# Patient Record
Sex: MALE | Race: WHITE | Employment: FULL TIME | URBAN - NONMETROPOLITAN AREA
[De-identification: names, ages, dates, MRNs, and addresses within clinical notes are randomized per-mention and may not be internally consistent; named-entity substitution may affect disease eponyms.]

---

## 2020-09-17 ENCOUNTER — OFFICE VISIT (OUTPATIENT)
Dept: FAMILY MEDICINE CLINIC | Age: 60
End: 2020-09-17
Payer: COMMERCIAL

## 2020-09-17 VITALS — TEMPERATURE: 98.8 F | HEART RATE: 88 BPM | OXYGEN SATURATION: 95 %

## 2020-09-17 DIAGNOSIS — J01.00 ACUTE MAXILLARY SINUSITIS, RECURRENCE NOT SPECIFIED: Primary | ICD-10-CM

## 2020-09-17 DIAGNOSIS — H66.90 ACUTE OTITIS MEDIA, UNSPECIFIED OTITIS MEDIA TYPE: ICD-10-CM

## 2020-09-17 PROCEDURE — 99213 OFFICE O/P EST LOW 20 MIN: CPT | Performed by: NURSE PRACTITIONER

## 2020-09-17 RX ORDER — DOXYCYCLINE 100 MG/1
100 TABLET ORAL 2 TIMES DAILY
Qty: 20 TAB | Refills: 0 | Status: SHIPPED | OUTPATIENT
Start: 2020-09-17 | End: 2020-09-27

## 2020-09-17 NOTE — PROGRESS NOTES
BOLA Justin is a 62 y.o. male and presents today for Sinus Pain (headache, runny nose,sneezing)  Reports sinus pain/pressure, ear pain and congestion x 1week; he has no concern for covid and does not want to be tested. Recent Travel Screening and Travel History documentation     Travel Screening     Question   Response    In the last month, have you been in contact with someone who was confirmed or suspected to have Coronavirus / COVID-19? No / Unsure    Do you have any of the following symptoms? Have you traveled internationally in the last month? No      Travel History   Travel since 08/17/20     No documented travel since 08/17/20          Allergies    Allergies   Allergen Reactions    Pcn [Penicillins] Other (comments)        Medications    Current Outpatient Medications   Medication Sig Dispense    doxycycline (ADOXA) 100 mg tablet Take 1 Tab by mouth two (2) times a day for 10 days. 20 Tab    Victoza 2-Gilberto 0.6 mg/0.1 mL (18 mg/3 mL) pnij INJECT 1.8 MG SUBCUTANEOUSLY ONCE DAILY 9 mL    atorvastatin (LIPITOR) 10 mg tablet TAKE 1 TABLET BY MOUTH ONCE DAILY     ONETOUCH ULTRA BLUE TEST STRIP strip USE 1 STRIP TO CHECK GLUCOSE ONCE DAILY PATIENT NEED APPOINTMENT     Insulin Needles, Disposable, 32 gauge x 5/32\" ndle USE SUBCUTANEOUSLY ONCE DAILY FOR VICTOZA INJECTIONS     JANUVIA 50 mg tablet      multivitamin (ONE A DAY) tablet Take 1 Tab by mouth daily.  metFORMIN (GLUCOPHAGE) 1,000 mg tablet      amLODIPine (NORVASC) 5 mg tablet Take 5 mg by mouth daily.  lisinopril-hydrochlorothiazide (PRINZIDE, ZESTORETIC) 20-25 mg per tablet Take  by mouth daily.  aspirin delayed-release 81 mg tablet Take  by mouth daily. No current facility-administered medications for this visit. Problem List    Patient Active Problem List    Diagnosis Date Noted    Obesity (BMI 30-39. 5)     Male erectile dysfunction     Morbid obesity with BMI of 45.0-49.9, adult ([facility] 04/19/2016    Erectile dysfunction 04/19/2016    Prostate cancer ([facility] 04/04/2016    Diabetes (HCC)     Low testosterone         Vitals    Visit Vitals  Pulse 88   Temp 98.8 °F (37.1 °C)   SpO2 95%        ROS    Review of Systems   Constitutional: Negative for chills and malaise/fatigue. HENT: Positive for congestion, ear pain and sinus pain. Negative for sore throat. Eyes: Negative for blurred vision. Respiratory: Negative for cough, sputum production, shortness of breath and wheezing. Cardiovascular: Negative for chest pain. Gastrointestinal: Negative for diarrhea, nausea and vomiting. Musculoskeletal: Negative for myalgias. Skin: Negative for rash. Neurological: Negative for dizziness, weakness and headaches. Endo/Heme/Allergies: Negative for environmental allergies. Physical Exam    Physical Exam  Vitals signs and nursing note reviewed. Constitutional:       General: He is awake. He is not in acute distress. Appearance: Normal appearance. He is not ill-appearing. HENT:      Head: Normocephalic and atraumatic. Right Ear: Ear canal and external ear normal. Tympanic membrane is erythematous and bulging. Left Ear: Ear canal and external ear normal. Tympanic membrane is erythematous. Nose: Congestion and rhinorrhea present. Right Turbinates: Enlarged and swollen. Left Turbinates: Enlarged and swollen. Right Sinus: Maxillary sinus tenderness present. No frontal sinus tenderness. Left Sinus: Maxillary sinus tenderness present. No frontal sinus tenderness. Mouth/Throat:      Mouth: Mucous membranes are moist.      Pharynx: Oropharynx is clear. Eyes:      Extraocular Movements: Extraocular movements intact. Neck:      Musculoskeletal: Normal range of motion and neck supple. Cardiovascular:      Rate and Rhythm: Normal rate and regular rhythm. Pulmonary:      Breath sounds: Normal breath sounds. No stridor. No wheezing, rhonchi or rales. Musculoskeletal: Normal range of motion. Skin:     Findings: No rash. Neurological:      Mental Status: He is alert and oriented to person, place, and time. Psychiatric:         Behavior: Behavior normal.          Assessment/Plan  1. Acute maxillary sinusitis, recurrence not specified  Given the duration of his illness, will go ahead and tx with abx per Rx; I recommend zyrtec and flonase if not already using; I recommend sinus rinses at least 2x/day; may use mucinex for thinning of mucous; increase fluids; tylenol prn as directed on packaging; f/u 1-2wks if not better, sooner for worsening symptoms.   - doxycycline (ADOXA) 100 mg tablet; Take 1 Tab by mouth two (2) times a day for 10 days. Dispense: 20 Tab; Refill: 0    2. Acute otitis media, unspecified otitis media type    - doxycycline (ADOXA) 100 mg tablet; Take 1 Tab by mouth two (2) times a day for 10 days. Dispense: 20 Tab; Refill: 0       GRACE Guzman, FNP-BC       This visit was provided as a focused evaluation during the COVID -19 pandemic/national emergency. A comprehensive review of all previous patient history and testing was not conducted. Pertinent findings were elicited during the visit.

## 2020-09-17 NOTE — PROGRESS NOTES
Vadim Notice presents today for   Chief Complaint   Patient presents with    Sinus Pain     headache, runny nose,sneezing       Is someone accompanying this pt? no, no (name) no (relationship) (temp no)    Is the patient using any DME equipment during OV? no    Depression Screening:  3 most recent PHQ Screens 9/17/2020   Little interest or pleasure in doing things Not at all   Feeling down, depressed, irritable, or hopeless Not at all   Total Score PHQ 2 0       Learning Assessment:  No flowsheet data found. Health Maintenance reviewed and discussed and ordered per Provider. Health Maintenance Due   Topic Date Due    Hepatitis C Screening  1960    Pneumococcal 0-64 years (1 of 1 - PPSV23) 08/11/1967    Foot Exam Q1  08/11/1971    A1C test (Diabetic or Prediabetic)  08/11/1971    MICROALBUMIN Q1  08/11/1971    Eye Exam Retinal or Dilated  08/11/1971    Lipid Screen  08/11/1971    DTaP/Tdap/Td series (1 - Tdap) 08/11/1982    Shingrix Vaccine Age 50> (1 of 2) 08/11/2011    FOBT Q1Y Age 50-75  08/11/2011    Flu Vaccine (1) 09/01/2020   . Coordination of Care:  1. Have you been to the ER, urgent care clinic since your last visit? Hospitalized since your last visit? no    2. Have you seen or consulted any other health care providers outside of the [address] [place] [address] since your last visit? Include any pap smears or colon screening.  no

## 2020-11-16 RX ORDER — METFORMIN HYDROCHLORIDE 1000 MG/1
TABLET ORAL
Qty: 180 TAB | Refills: 0 | Status: SHIPPED | OUTPATIENT
Start: 2020-11-16 | End: 2021-02-15

## 2020-11-20 DIAGNOSIS — Z12.5 SCREENING FOR MALIGNANT NEOPLASM OF PROSTATE: ICD-10-CM

## 2020-11-20 DIAGNOSIS — Z13.29 THYROID DISORDER SCREEN: ICD-10-CM

## 2020-11-20 DIAGNOSIS — E55.9 VITAMIN D DEFICIENCY: ICD-10-CM

## 2020-11-20 DIAGNOSIS — E11.9 TYPE 2 DIABETES MELLITUS WITHOUT COMPLICATION, UNSPECIFIED WHETHER LONG TERM INSULIN USE (HCC): ICD-10-CM

## 2020-11-20 DIAGNOSIS — E53.8 COBALAMIN DEFICIENCY: ICD-10-CM

## 2020-11-20 DIAGNOSIS — E11.9 TYPE 2 DIABETES MELLITUS WITHOUT COMPLICATION, UNSPECIFIED WHETHER LONG TERM INSULIN USE (HCC): Primary | ICD-10-CM

## 2020-11-20 DIAGNOSIS — Z00.00 ENCOUNTER FOR ROUTINE ADULT HEALTH EXAMINATION WITHOUT ABNORMAL FINDINGS: ICD-10-CM

## 2020-12-04 RX ORDER — ATORVASTATIN CALCIUM 10 MG/1
TABLET, FILM COATED ORAL
Qty: 90 TAB | Refills: 0 | Status: SHIPPED | OUTPATIENT
Start: 2020-12-04 | End: 2021-03-08

## 2020-12-15 LAB — HBA1C MFR BLD HPLC: 9 %

## 2021-02-15 RX ORDER — METFORMIN HYDROCHLORIDE 1000 MG/1
TABLET ORAL
Qty: 180 TAB | Refills: 0 | Status: SHIPPED | OUTPATIENT
Start: 2021-02-15 | End: 2021-05-17

## 2021-02-16 ENCOUNTER — CLINICAL SUPPORT (OUTPATIENT)
Dept: FAMILY MEDICINE CLINIC | Age: 61
End: 2021-02-16
Payer: COMMERCIAL

## 2021-02-16 DIAGNOSIS — E11.9 TYPE 2 DIABETES MELLITUS WITHOUT COMPLICATION, UNSPECIFIED WHETHER LONG TERM INSULIN USE (HCC): Primary | ICD-10-CM

## 2021-02-16 LAB — HBA1C MFR BLD HPLC: 9.7 %

## 2021-02-16 PROCEDURE — 83036 HEMOGLOBIN GLYCOSYLATED A1C: CPT | Performed by: NURSE PRACTITIONER

## 2021-02-16 NOTE — PROGRESS NOTES
Cleaned patient's left middle finger with alcohol wipe. Fingerstick in patient's left middle finger for A1C check. Patient tolerated well, bleeding controlled.

## 2021-02-18 ENCOUNTER — OFFICE VISIT (OUTPATIENT)
Dept: FAMILY MEDICINE CLINIC | Age: 61
End: 2021-02-18

## 2021-02-18 VITALS — WEIGHT: 287 LBS | BODY MASS INDEX: 38.87 KG/M2 | HEIGHT: 72 IN | RESPIRATION RATE: 19 BRPM

## 2021-02-18 DIAGNOSIS — E66.01 MORBID OBESITY (HCC): Primary | ICD-10-CM

## 2021-02-18 DIAGNOSIS — E11.9 TYPE 2 DIABETES MELLITUS WITHOUT COMPLICATION, WITHOUT LONG-TERM CURRENT USE OF INSULIN (HCC): ICD-10-CM

## 2021-02-18 DIAGNOSIS — Z02.4 DRIVER'S PERMIT PHYSICAL EXAMINATION: ICD-10-CM

## 2021-02-18 PROCEDURE — 99213 OFFICE O/P EST LOW 20 MIN: CPT | Performed by: NURSE PRACTITIONER

## 2021-02-18 RX ORDER — CHOLECALCIFEROL (VITAMIN D3) 50 MCG
1 CAPSULE ORAL DAILY
COMMUNITY

## 2021-02-18 RX ORDER — PHENTERMINE HYDROCHLORIDE 37.5 MG/1
TABLET ORAL
Qty: 30 TAB | Refills: 2 | Status: SHIPPED | OUTPATIENT
Start: 2021-02-18 | End: 2021-05-17

## 2021-02-18 NOTE — PROGRESS NOTES
Solomon Epperson presents today for   Chief Complaint   Patient presents with   • Employment Physical       Is someone accompanying this pt? No    Is the patient using any DME equipment during OV? No    Depression Screening:  3 most recent PHQ Screens 2/18/2021   Little interest or pleasure in doing things Not at all   Feeling down, depressed, irritable, or hopeless Not at all   Total Score PHQ 2 0       Learning Assessment:  Learning Assessment 2/18/2021   PRIMARY LEARNER Patient   HIGHEST LEVEL OF EDUCATION - PRIMARY LEARNER  GRADUATED HIGH SCHOOL OR GED   BARRIERS PRIMARY LEARNER NONE   CO-LEARNER CAREGIVER No   PRIMARY LANGUAGE ENGLISH   LEARNER PREFERENCE PRIMARY LISTENING   ANSWERED BY patient   RELATIONSHIP SELF         Health Maintenance reviewed and discussed and ordered per Provider.    Health Maintenance Due   Topic Date Due   • Pneumococcal 0-64 years (1 of 1 - PPSV23) 08/11/1967   • Foot Exam Q1  08/11/1971   • MICROALBUMIN Q1  08/11/1971   • Eye Exam Retinal or Dilated  08/11/1971   • COVID-19 Vaccine (1 of 2) 08/11/1977   • DTaP/Tdap/Td series (1 - Tdap) 08/11/1982   • Shingrix Vaccine Age 50> (1 of 2) 08/11/2011   • Colorectal Cancer Screening Combo  08/11/2011   • Flu Vaccine (1) 09/01/2020   .      Coordination of Care:  1. Have you been to the ER, urgent care clinic since your last visit? Hospitalized since your last visit? No    2. Have you seen or consulted any other health care providers outside of the [facility] since your last visit? Include any pap smears or colon screening. No

## 2021-02-18 NOTE — PROGRESS NOTES
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 9)     [facility]     [id number] [place] 7 (3+4) adenocarcinoma of the prostate in 7/12 cores involving 20-70% of each specimen and [place] 6 disease in 5/12 cores involving 3-50% of each specimen     Past Surgical History:   Procedure Laterality Date    HX APPENDECTOMY      HX PROSTATECTOMY  2016    [facility] Dr Huston     Social History     Socioeconomic History    Marital status:      Spouse name: Not on file    Number of children: Not on file    Years of education: Not on file    Highest education level: Not on file   Tobacco Use    Smoking status: Former Smoker     Years: 10.00     Quit date: 2010     Years since quittin.0    Smokeless tobacco: Never Used   Substance and Sexual Activity    Alcohol use: Yes     Alcohol/week: 0.0 standard drinks     Frequency: 2-4 times a month     Drinks per session: 1 or 2     Binge frequency: Never    Drug use: Never     Current Outpatient Medications   Medication Sig Dispense Refill    omega 3-dha-epa-fish oil (Fish Oil) 100-160-1,000 mg cap Take 1 Cap by mouth daily.  phentermine (ADIPEX-P) 37.5 mg tablet 1 tab po once daily 30 Tab 2    dapagliflozin (Farxiga) 10 mg tab tablet Take 1 Tab by mouth daily.  30 Tab 2    metFORMIN (GLUCOPHAGE) 1,000 mg tablet Take 1 tablet by mouth twice daily 180 Tab 0    atorvastatin (LIPITOR) 10 mg tablet Take 1 tablet by mouth once daily 90 Tab 0    Victoza 3-Gilberto 0.6 mg/0.1 mL (18 mg/3 mL) pnij INJECT 1.8 MG SUBCUTANEOUSLY ONCE DAILY 9 mL 3    ONETOUCH ULTRA BLUE TEST STRIP strip USE 1 STRIP TO CHECK GLUCOSE ONCE DAILY PATIENT NEED APPOINTMENT  5    Insulin Needles, Disposable, 32 gauge x \" ndle USE SUBCUTANEOUSLY ONCE DAILY FOR VICTOZA INJECTIONS  5    multivitamin (ONE A DAY) tablet Take 1 Tab by mouth daily.  amLODIPine (NORVASC) 5 mg tablet Take 5 mg by mouth daily.  lisinopril-hydrochlorothiazide (PRINZIDE, ZESTORETIC) 20-25 mg per tablet Take  by mouth daily.  aspirin delayed-release 81 mg tablet Take  by mouth daily. Allergies   Allergen Reactions    Pcn [Penicillins] Other (comments)     The patient has a family history of    REVIEW OF SYSTEMS  Review of Systems   Constitutional: Negative. HENT: Negative. Eyes: Negative. Respiratory: Negative for cough, sputum production, shortness of breath and wheezing. Cardiovascular: Negative for chest pain, palpitations, orthopnea and leg swelling. Gastrointestinal: Negative for abdominal pain, constipation, diarrhea, heartburn, nausea and vomiting. Genitourinary: Negative. Musculoskeletal: Negative. Skin: Negative. Neurological: Negative. Endo/Heme/Allergies: Negative. Psychiatric/Behavioral: Negative.         Objective:     Visit Vitals  Resp 19   Ht 6' (1.829 m)   Wt 287 lb (130.2 kg)   BMI 38.92 kg/m²       Current Outpatient Medications   Medication Instructions    amLODIPine (NORVASC) 5 mg, Oral, DAILY    aspirin delayed-release 81 mg tablet Oral, DAILY    atorvastatin (LIPITOR) 10 mg tablet Take 1 tablet by mouth once daily    dapagliflozin (FARXIGA) 10 mg, Oral, DAILY    Insulin Needles, Disposable, 32 gauge x 5/32\" ndle USE SUBCUTANEOUSLY ONCE DAILY FOR VICTOZA INJECTIONS    lisinopril-hydrochlorothiazide (PRINZIDE, ZESTORETIC) 20-25 mg per tablet Oral, DAILY    metFORMIN (GLUCOPHAGE) 1,000 mg tablet Take 1 tablet by mouth twice daily    multivitamin (ONE A DAY) tablet 1 Tab, Oral, DAILY    omega 3-dha-epa-fish oil (Fish Oil) 100-160-1,000 mg cap 1 Cap, Oral, DAILY    ONETOUCH ULTRA BLUE TEST STRIP strip USE 1 STRIP TO CHECK GLUCOSE ONCE DAILY PATIENT NEED APPOINTMENT    phentermine (ADIPEX-P) 37.5 mg tablet 1 tab po once daily    Victoza 3-Gilberto 0.6 mg/0.1 mL (18 mg/3 mL) pnij INJECT 1.8 MG SUBCUTANEOUSLY ONCE DAILY        PHYSICAL EXAM  Physical Exam  Constitutional:       Appearance: He is obese. HENT:      Head: Normocephalic and atraumatic. Right Ear: Tympanic membrane, ear canal and external ear normal.      Left Ear: Tympanic membrane, ear canal and external ear normal.      Nose: Nose normal.      Mouth/Throat:      Mouth: Mucous membranes are moist.      Pharynx: Oropharynx is clear. Eyes:      General: No scleral icterus. Conjunctiva/sclera: Conjunctivae normal.      Pupils: Pupils are equal, round, and reactive to light. Neck:      Musculoskeletal: Normal range of motion. Cardiovascular:      Rate and Rhythm: Normal rate and regular rhythm. Pulses: Normal pulses. Heart sounds: Normal heart sounds. No murmur. Pulmonary:      Effort: Pulmonary effort is normal. No respiratory distress. Breath sounds: Normal breath sounds. Musculoskeletal:         General: No swelling or tenderness. Skin:     General: Skin is warm and dry. Capillary Refill: Capillary refill takes less than 2 seconds. Findings: No erythema. Neurological:      Mental Status: He is alert and oriented to person, place, and time. Psychiatric:         Mood and Affect: Mood normal.         Behavior: Behavior normal.         Thought Content: Thought content normal.         Judgment: Judgment normal.             Assessment/Plan:     Diagnoses and all orders for this visit:    1. Morbid obesity ([address]  -     phentermine (ADIPEX-P) 37.5 mg tablet; 1 tab po once daily    2. Type 2 diabetes mellitus without complication, without long-term current use of insulin (HCC)  -     dapagliflozin (Farxiga) 10 mg tab tablet; Take 1 Tab by mouth daily.     3. 's permit physical examination             The patient is recertified for 1 year(s)      Yajaira Calvillo NP

## 2021-03-29 DIAGNOSIS — E11.9 TYPE 2 DIABETES MELLITUS WITHOUT COMPLICATION, UNSPECIFIED WHETHER LONG TERM INSULIN USE (HCC): ICD-10-CM

## 2021-03-29 RX ORDER — LIRAGLUTIDE 6 MG/ML
INJECTION SUBCUTANEOUS
Qty: 9 ML | Refills: 0 | Status: SHIPPED | OUTPATIENT
Start: 2021-03-29 | End: 2021-05-06

## 2021-03-29 RX ORDER — AMLODIPINE BESYLATE 5 MG/1
TABLET ORAL
Qty: 90 TAB | Refills: 0 | Status: SHIPPED | OUTPATIENT
Start: 2021-03-29 | End: 2021-06-28

## 2021-03-29 RX ORDER — LISINOPRIL AND HYDROCHLOROTHIAZIDE 20; 25 MG/1; MG/1
TABLET ORAL
Qty: 90 TAB | Refills: 0 | Status: SHIPPED | OUTPATIENT
Start: 2021-03-29 | End: 2021-06-28

## 2021-05-04 DIAGNOSIS — E11.9 TYPE 2 DIABETES MELLITUS WITHOUT COMPLICATION, UNSPECIFIED WHETHER LONG TERM INSULIN USE (HCC): ICD-10-CM

## 2021-05-06 RX ORDER — LIRAGLUTIDE 6 MG/ML
INJECTION SUBCUTANEOUS
Qty: 9 ML | Refills: 0 | Status: SHIPPED | OUTPATIENT
Start: 2021-05-06 | End: 2021-06-01

## 2021-05-17 DIAGNOSIS — E66.01 MORBID OBESITY (HCC): ICD-10-CM

## 2021-05-17 RX ORDER — PHENTERMINE HYDROCHLORIDE 37.5 MG/1
TABLET ORAL
Qty: 30 TAB | Refills: 0 | Status: SHIPPED | OUTPATIENT
Start: 2021-05-17 | End: 2021-06-17 | Stop reason: SDUPTHER

## 2021-05-17 RX ORDER — METFORMIN HYDROCHLORIDE 1000 MG/1
TABLET ORAL
Qty: 180 TAB | Refills: 0 | Status: SHIPPED | OUTPATIENT
Start: 2021-05-17 | End: 2021-08-17

## 2021-06-01 DIAGNOSIS — E11.9 TYPE 2 DIABETES MELLITUS WITHOUT COMPLICATION, WITHOUT LONG-TERM CURRENT USE OF INSULIN (HCC): ICD-10-CM

## 2021-06-01 DIAGNOSIS — E11.9 TYPE 2 DIABETES MELLITUS WITHOUT COMPLICATION, UNSPECIFIED WHETHER LONG TERM INSULIN USE (HCC): ICD-10-CM

## 2021-06-01 RX ORDER — DAPAGLIFLOZIN 10 MG/1
TABLET, FILM COATED ORAL
Qty: 30 TABLET | Refills: 0 | Status: SHIPPED | OUTPATIENT
Start: 2021-06-01 | End: 2021-06-28

## 2021-06-01 RX ORDER — LIRAGLUTIDE 6 MG/ML
INJECTION SUBCUTANEOUS
Qty: 9 ML | Refills: 0 | Status: SHIPPED | OUTPATIENT
Start: 2021-06-01 | End: 2021-07-09

## 2021-06-17 ENCOUNTER — OFFICE VISIT (OUTPATIENT)
Dept: FAMILY MEDICINE CLINIC | Age: 61
End: 2021-06-17
Payer: COMMERCIAL

## 2021-06-17 VITALS
HEART RATE: 90 BPM | RESPIRATION RATE: 18 BRPM | HEIGHT: 72 IN | OXYGEN SATURATION: 97 % | TEMPERATURE: 98.2 F | WEIGHT: 270.2 LBS | DIASTOLIC BLOOD PRESSURE: 66 MMHG | SYSTOLIC BLOOD PRESSURE: 110 MMHG | BODY MASS INDEX: 36.6 KG/M2

## 2021-06-17 DIAGNOSIS — Z00.00 WELLNESS EXAMINATION: ICD-10-CM

## 2021-06-17 DIAGNOSIS — E66.01 MORBID OBESITY (HCC): ICD-10-CM

## 2021-06-17 DIAGNOSIS — E11.9 TYPE 2 DIABETES MELLITUS WITHOUT COMPLICATION, WITHOUT LONG-TERM CURRENT USE OF INSULIN (HCC): Primary | ICD-10-CM

## 2021-06-17 LAB — HBA1C MFR BLD HPLC: 7.4 %

## 2021-06-17 PROCEDURE — 99214 OFFICE O/P EST MOD 30 MIN: CPT | Performed by: NURSE PRACTITIONER

## 2021-06-17 PROCEDURE — 36415 COLL VENOUS BLD VENIPUNCTURE: CPT | Performed by: NURSE PRACTITIONER

## 2021-06-17 RX ORDER — PHENTERMINE HYDROCHLORIDE 37.5 MG/1
TABLET ORAL
Qty: 30 TABLET | Refills: 2 | Status: SHIPPED | OUTPATIENT
Start: 2021-06-17

## 2021-06-17 NOTE — PROGRESS NOTES
Edward Tellez presents today for   Chief Complaint   Patient presents with    Follow-up       Is someone accompanying this pt? No    Is the patient using any DME equipment during OV? No    Depression Screening:  3 most recent PHQ Screens 6/17/2021   Little interest or pleasure in doing things Not at all   Feeling down, depressed, irritable, or hopeless Not at all   Total Score PHQ 2 0       Learning Assessment:  Learning Assessment 2/18/2021   PRIMARY LEARNER Patient   HIGHEST LEVEL OF EDUCATION - PRIMARY LEARNER  GRADUATED HIGH SCHOOL OR GED   BARRIERS PRIMARY LEARNER NONE   CO-LEARNER CAREGIVER No   PRIMARY LANGUAGE ENGLISH   LEARNER PREFERENCE PRIMARY LISTENING   ANSWERED BY patient   RELATIONSHIP SELF       Fall Risk  No flowsheet data found. Health Maintenance reviewed and discussed and ordered per Provider. Health Maintenance Due   Topic Date Due    Pneumococcal 0-64 years (1 of 2 - PPSV23) Never done    Foot Exam Q1  Never done    MICROALBUMIN Q1  Never done    Eye Exam Retinal or Dilated  Never done    COVID-19 Vaccine (1) Never done    DTaP/Tdap/Td series (1 - Tdap) Never done    Shingrix Vaccine Age 50> (1 of 2) Never done    A1C test (Diabetic or Prediabetic)  05/16/2021   . Coordination of Care:  1. Have you been to the ER, urgent care clinic since your last visit? Hospitalized since your last visit? NO    2. Have you seen or consulted any other health care providers outside of the [address] [place] [address] since your last visit? Include any pap smears or colon screening.  No

## 2021-06-17 NOTE — PROGRESS NOTES
Edward Tellez is a 62 y.o.male presents with   Chief Complaint   Patient presents with    Follow-up       60-year-old male presents today for routine follow-up    Subjective:           Past Medical History:   Diagnosis Date    [facility]     Low testosterone     Male erectile dysfunction     Obesity (BMI 30-39. 9)     [facility]     [id number] [place] 7 (3+4) adenocarcinoma of the prostate in 7/12 cores involving 20-70% of each specimen and [place] 6 disease in 5/12 cores involving 3-50% of each specimen     Past Surgical History:   Procedure Laterality Date    HX APPENDECTOMY      HX PROSTATECTOMY  2016    [facility] Dr Huston     Social History     Socioeconomic History    Marital status:      Spouse name: Not on file    Number of children: Not on file    Years of education: Not on file    Highest education level: Not on file   Tobacco Use    Smoking status: Former Smoker     Years: 10.00     Quit date: 2010     Years since quittin.3    Smokeless tobacco: Never Used   Vaping Use    Vaping Use: Never used   Substance and Sexual Activity    Alcohol use: Yes     Alcohol/week: 0.0 standard drinks    Drug use: Never     Social Determinants of Health     Financial Resource Strain:     Difficulty of Paying Living Expenses:    Food Insecurity:     Worried About Running Out of Food in the Last Year:     [address] in the Last Year:    Transportation Needs:     Lack of Transportation (Medical):      Lack of Transportation (Non-Medical):    Physical Activity:     Days of Exercise per Week:     Minutes of Exercise per Session:    Stress:     Feeling of Stress :    Social Connections:     Frequency of Communication with Friends and Family:     Frequency of Social Gatherings with Friends and Family:     Attends [employer] Services:     Active Member of Clubs or Organizations:     Attends Club or Organization Meetings:     Marital Status:      Current Outpatient Medications   Medication Sig Dispense Refill    phentermine (ADIPEX-P) 37.5 mg tablet Take 1 tablet by mouth once daily 30 Tablet 2    Farxiga 10 mg tab tablet Take 1 tablet by mouth once daily 30 Tablet 0    Victoza 2-Gilberto 0.6 mg/0.1 mL (18 mg/3 mL) pnij INJECT 1.8 MG SUBCUTANEOUSLY ONCE DAILY 9 mL 0    metFORMIN (GLUCOPHAGE) 1,000 mg tablet Take 1 tablet by mouth twice daily 180 Tab 0    Insulin Needles, Disposable, 32 gauge x 5/32\" ndle USE 1  SUBCUTANEOUSLY ONCE DAILY FOR  VICTOZA  INJECTIONS 100 Pen Needle 5    lisinopril-hydroCHLOROthiazide (PRINZIDE, ZESTORETIC) 20-25 mg per tablet Take 1 tablet by mouth once daily 90 Tab 0    amLODIPine (NORVASC) 5 mg tablet Take 1 tablet by mouth once daily for 90 days 90 Tab 0    atorvastatin (LIPITOR) 10 mg tablet Take 1 tablet by mouth once daily 90 Tab 2    omega 3-dha-epa-fish oil (Fish Oil) 100-160-1,000 mg cap Take 1 Cap by mouth daily.  ONETOUCH ULTRA BLUE TEST STRIP strip USE 1 STRIP TO CHECK GLUCOSE ONCE DAILY PATIENT NEED APPOINTMENT  5    multivitamin (ONE A DAY) tablet Take 1 Tab by mouth daily.  aspirin delayed-release 81 mg tablet Take  by mouth daily. Allergies   Allergen Reactions    Pcn [Penicillins] Other (comments)     The patient has a family history of    REVIEW OF SYSTEMS  Review of Systems   Constitutional: Negative for chills and fever. HENT: Negative for ear discharge, ear pain, hearing loss, sinus pain and sore throat. Eyes: Negative for pain. Respiratory: Negative for cough and shortness of breath. Cardiovascular: Negative for chest pain, palpitations and leg swelling. Gastrointestinal: Negative for abdominal pain, nausea and vomiting. Genitourinary: Negative for dysuria, frequency and urgency. Musculoskeletal: Negative for falls, myalgias and neck pain. Skin: Negative for rash. Neurological: Negative for dizziness, tingling, tremors and headaches.    Psychiatric/Behavioral: Negative for depression, substance abuse and suicidal ideas. The patient is not nervous/anxious and does not have insomnia. Objective:     Visit Vitals  /66 (BP 1 Location: Left upper arm, BP Patient Position: Sitting, BP Cuff Size: Large adult)   Pulse 90   Temp 98.2 °F (36.8 °C) (Temporal)   Resp 18   Ht 6' (1.829 m)   Wt 270 lb 3.2 oz (122.6 kg)   SpO2 97%   BMI 36.65 kg/m²       Current Outpatient Medications   Medication Instructions    amLODIPine (NORVASC) 5 mg tablet Take 1 tablet by mouth once daily for 90 days    aspirin delayed-release 81 mg tablet Oral, DAILY    atorvastatin (LIPITOR) 10 mg tablet Take 1 tablet by mouth once daily    Farxiga 10 mg tab tablet Take 1 tablet by mouth once daily    Insulin Needles, Disposable, 32 gauge x 5/32\" ndle USE 1  SUBCUTANEOUSLY ONCE DAILY FOR  VICTOZA  INJECTIONS    lisinopril-hydroCHLOROthiazide (PRINZIDE, ZESTORETIC) 20-25 mg per tablet Take 1 tablet by mouth once daily    metFORMIN (GLUCOPHAGE) 1,000 mg tablet Take 1 tablet by mouth twice daily    multivitamin (ONE A DAY) tablet 1 Tablet, Oral, DAILY    omega 3-dha-epa-fish oil (Fish Oil) 100-160-1,000 mg cap 1 Capsule, Oral, DAILY    ONETOUCH ULTRA BLUE TEST STRIP strip USE 1 STRIP TO CHECK GLUCOSE ONCE DAILY PATIENT NEED APPOINTMENT    phentermine (ADIPEX-P) 37.5 mg tablet Take 1 tablet by mouth once daily    Victoza 2-Gilberto 0.6 mg/0.1 mL (18 mg/3 mL) pnij INJECT 1.8 MG SUBCUTANEOUSLY ONCE DAILY        PHYSICAL EXAM  Physical Exam  Constitutional:       Appearance: He is obese. HENT:      Head: Normocephalic and atraumatic. Right Ear: Tympanic membrane, ear canal and external ear normal.      Left Ear: Tympanic membrane, ear canal and external ear normal.      Nose: Nose normal.      Mouth/Throat:      Mouth: Mucous membranes are moist.      Pharynx: Oropharynx is clear. Eyes:      General: No scleral icterus.      Conjunctiva/sclera: Conjunctivae normal.      Pupils: Pupils are equal, round, and reactive to light. Cardiovascular:      Rate and Rhythm: Normal rate and regular rhythm. Pulses: Normal pulses. Heart sounds: Normal heart sounds. No murmur heard. Pulmonary:      Effort: Pulmonary effort is normal. No respiratory distress. Breath sounds: Normal breath sounds. Musculoskeletal:         General: No swelling or tenderness. Cervical back: Normal range of motion. Skin:     General: Skin is warm and dry. Capillary Refill: Capillary refill takes less than 2 seconds. Findings: No erythema. Neurological:      Mental Status: He is alert and oriented to person, place, and time. Psychiatric:         Mood and Affect: Mood normal.         Behavior: Behavior normal.         Thought Content: Thought content normal.         Judgment: Judgment normal.         Assessment/Plan:     Diagnoses and all orders for this visit:    1. Type 2 diabetes mellitus without complication, without long-term current use of insulin ([facility]    2. Morbid obesity (HCC)  -     phentermine (ADIPEX-P) 37.5 mg tablet; Take 1 tablet by mouth once daily    3. Wellness examination  -     LIPID PANEL  -     METABOLIC PANEL, COMPREHENSIVE  -     HEMOGLOBIN A1C WITH EAG  -     COLLECTION VENOUS BLOOD,VENIPUNCTURE                   Disclaimer:    I have discussed the diagnosis with the patient and the intended plan as seen above. The patient understands our medical plan. The risks, benefits and significant side effects of all medications have been reviewed. Anticipated time course and progression of condition reviewed. All questions have been addressed. He received an after visit summary, with information reviewed, and questions answered. Where appropriate, he is instructed to call the clinic if he has not been notified either by phone or through [address] with the results of his tests or with an appointment plan for any referrals within 1 week(s).  The patient  is to call if his condition worsens or fails to improve or if significant side effects are experienced.        Mark Roberson, NP

## 2021-06-17 NOTE — PROGRESS NOTES
Patient presents for lab draw ordered by:    Ordering Provider: LISA Fagan  Ordering Department/Practice:  [facility]  Phone:  [phone]  Date Ordered:  6/17/21    Labs were drawn and sent to [facility] by Jean-Paul Malone:    The following tubes were sent:    Tiger SST ( 1) and Lavender  ( 1)    Draw site right brachial.  Patient tolerated draw with no distress.

## 2021-06-18 LAB
ALBUMIN SERPL-MCNC: 4.3 G/DL (ref 3.8–4.9)
ALBUMIN/GLOB SERPL: 1.9 {RATIO} (ref 1.2–2.2)
ALP SERPL-CCNC: 70 IU/L (ref 48–121)
ALT SERPL-CCNC: 22 IU/L (ref 0–44)
AST SERPL-CCNC: 19 IU/L (ref 0–40)
BILIRUB SERPL-MCNC: 0.3 MG/DL (ref 0–1.2)
BUN SERPL-MCNC: 15 MG/DL (ref 6–24)
BUN/CREAT SERPL: 19 (ref 9–20)
CALCIUM SERPL-MCNC: 9.8 MG/DL (ref 8.7–10.2)
CHLORIDE SERPL-SCNC: 102 MMOL/L (ref 96–106)
CHOLEST SERPL-MCNC: 95 MG/DL (ref 100–199)
CO2 SERPL-SCNC: 23 MMOL/L (ref 20–29)
CREAT SERPL-MCNC: 0.8 MG/DL (ref 0.76–1.27)
EST. AVERAGE GLUCOSE BLD GHB EST-MCNC: 166 MG/DL
GLOBULIN SER CALC-MCNC: 2.3 G/DL (ref 1.5–4.5)
GLUCOSE SERPL-MCNC: 131 MG/DL (ref 65–99)
HBA1C MFR BLD: 7.4 % (ref 4.8–5.6)
HDLC SERPL-MCNC: 28 MG/DL
LDLC SERPL CALC-MCNC: 41 MG/DL (ref 0–99)
POTASSIUM SERPL-SCNC: 4.2 MMOL/L (ref 3.5–5.2)
PROT SERPL-MCNC: 6.6 G/DL (ref 6–8.5)
SODIUM SERPL-SCNC: 141 MMOL/L (ref 134–144)
TRIGL SERPL-MCNC: 151 MG/DL (ref 0–149)
VLDLC SERPL CALC-MCNC: 26 MG/DL (ref 5–40)

## 2021-06-28 DIAGNOSIS — E11.9 TYPE 2 DIABETES MELLITUS WITHOUT COMPLICATION, WITHOUT LONG-TERM CURRENT USE OF INSULIN (HCC): ICD-10-CM

## 2021-06-28 RX ORDER — LISINOPRIL AND HYDROCHLOROTHIAZIDE 20; 25 MG/1; MG/1
TABLET ORAL
Qty: 90 TABLET | Refills: 0 | Status: SHIPPED | OUTPATIENT
Start: 2021-06-28 | End: 2021-09-28

## 2021-06-28 RX ORDER — AMLODIPINE BESYLATE 5 MG/1
TABLET ORAL
Qty: 90 TABLET | Refills: 0 | Status: SHIPPED | OUTPATIENT
Start: 2021-06-28 | End: 2021-09-28

## 2021-06-28 RX ORDER — DAPAGLIFLOZIN 10 MG/1
TABLET, FILM COATED ORAL
Qty: 30 TABLET | Refills: 0 | Status: SHIPPED | OUTPATIENT
Start: 2021-06-28 | End: 2021-08-03

## 2021-09-17 ENCOUNTER — OFFICE VISIT (OUTPATIENT)
Dept: FAMILY MEDICINE CLINIC | Age: 61
End: 2021-09-17
Payer: COMMERCIAL

## 2021-09-17 VITALS
RESPIRATION RATE: 19 BRPM | SYSTOLIC BLOOD PRESSURE: 114 MMHG | BODY MASS INDEX: 37.98 KG/M2 | DIASTOLIC BLOOD PRESSURE: 74 MMHG | OXYGEN SATURATION: 98 % | HEIGHT: 72 IN | HEART RATE: 77 BPM | WEIGHT: 280.4 LBS | TEMPERATURE: 98 F

## 2021-09-17 DIAGNOSIS — H92.02 OTALGIA OF LEFT EAR: Primary | ICD-10-CM

## 2021-09-17 DIAGNOSIS — H61.22 IMPACTED CERUMEN OF LEFT EAR: ICD-10-CM

## 2021-09-17 PROCEDURE — 99213 OFFICE O/P EST LOW 20 MIN: CPT | Performed by: NURSE PRACTITIONER

## 2021-09-17 NOTE — PROGRESS NOTES
Aneta Cornell is a 62 y.o.male presents with   Chief Complaint   Patient presents with    Follow-up       81-year-old male presents today in office with complaint of pain and \"fullness \"to the left ear going on to state \"it moves around from ear to ear \". He states this is been ongoing for roughly 1 month. He relays that his visit today was originally for follow-up on diabetes however he is not due for that follow-up until 2021. Subjective:           Past Medical History:   Diagnosis Date    Diabetes ([facility]     Low testosterone     Male erectile dysfunction     Obesity (BMI 30-39. 9)     Prostate cancer ([facility]     [id number] [place] 7 (3+4) adenocarcinoma of the prostate in 7/12 cores involving 20-70% of each specimen and [place] 6 disease in 5/12 cores involving 3-50% of each specimen     Past Surgical History:   Procedure Laterality Date    HX APPENDECTOMY      HX PROSTATECTOMY  2016    [facility] Dr Huston     Social History     Socioeconomic History    Marital status:      Spouse name: Not on file    Number of children: Not on file    Years of education: Not on file    Highest education level: Not on file   Tobacco Use    Smoking status: Former Smoker     Years: 10.00     Quit date: 2010     Years since quittin.6    Smokeless tobacco: Never Used   Vaping Use    Vaping Use: Never used   Substance and Sexual Activity    Alcohol use: Yes     Alcohol/week: 0.0 standard drinks    Drug use: Never     Social Determinants of Health     Financial Resource Strain:     Difficulty of Paying Living Expenses:    Food Insecurity:     Worried About Running Out of Food in the Last Year:     [address] in the Last Year:    Transportation Needs:     Lack of Transportation (Medical):      Lack of Transportation (Non-Medical):    Physical Activity:     Days of Exercise per Week:     Minutes of Exercise per Session:    Stress:     Feeling of Stress :    Social Connections:     Frequency of Communication with Friends and Family:     Frequency of Social Gatherings with Friends and Family:     Attends [employer] Services:     Active Member of Clubs or Organizations:     Attends Club or Organization Meetings:     Marital Status:      Current Outpatient Medications   Medication Sig Dispense Refill    metFORMIN (GLUCOPHAGE) 1,000 mg tablet Take 1 tablet by mouth twice daily 180 Tablet 1    Farxiga 10 mg tab tablet Take 1 tablet by mouth once daily 90 Tablet 1    Victoza 2-Gilberto 0.6 mg/0.1 mL (18 mg/3 mL) pnij INJECT 1.8 MG  SUBCUTANEOUSLY ONCE A DAY 9 mL 2    lisinopril-hydroCHLOROthiazide (PRINZIDE, ZESTORETIC) 20-25 mg per tablet Take 1 tablet by mouth once daily 90 Tablet 0    amLODIPine (NORVASC) 5 mg tablet Take 1 tablet by mouth once daily 90 Tablet 0    phentermine (ADIPEX-P) 37.5 mg tablet Take 1 tablet by mouth once daily 30 Tablet 2    Insulin Needles, Disposable, 32 gauge x 5/32\" ndle USE 1  SUBCUTANEOUSLY ONCE DAILY FOR  VICTOZA  INJECTIONS 100 Pen Needle 5    atorvastatin (LIPITOR) 10 mg tablet Take 1 tablet by mouth once daily 90 Tab 2    omega 3-dha-epa-fish oil (Fish Oil) 100-160-1,000 mg cap Take 1 Cap by mouth daily.  ONETOUCH ULTRA BLUE TEST STRIP strip USE 1 STRIP TO CHECK GLUCOSE ONCE DAILY PATIENT NEED APPOINTMENT  5    multivitamin (ONE A DAY) tablet Take 1 Tab by mouth daily.  aspirin delayed-release 81 mg tablet Take  by mouth daily. Allergies   Allergen Reactions    Pcn [Penicillins] Other (comments)     The patient has a family history of    REVIEW OF SYSTEMS  Review of Systems   Constitutional: Negative for fever. HENT: Positive for ear pain. Negative for ear discharge. Respiratory: Negative for cough.         Objective:     Visit Vitals  /74 (BP 1 Location: Left upper arm, BP Patient Position: Sitting, BP Cuff Size: Large adult)   Pulse 77   Temp 98 °F (36.7 °C) (Temporal)   Resp 19   Ht 6' (1.829 m)   Wt 280 lb 6.4 oz (127.2 kg)   SpO2 98%   BMI 38.03 kg/m²       Current Outpatient Medications   Medication Instructions    amLODIPine (NORVASC) 5 mg tablet Take 1 tablet by mouth once daily    aspirin delayed-release 81 mg tablet Oral, DAILY    atorvastatin (LIPITOR) 10 mg tablet Take 1 tablet by mouth once daily    Farxiga 10 mg tab tablet Take 1 tablet by mouth once daily    Insulin Needles, Disposable, 32 gauge x 5/32\" ndle USE 1  SUBCUTANEOUSLY ONCE DAILY FOR  VICTOZA  INJECTIONS    lisinopril-hydroCHLOROthiazide (PRINZIDE, ZESTORETIC) 20-25 mg per tablet Take 1 tablet by mouth once daily    metFORMIN (GLUCOPHAGE) 1,000 mg tablet Take 1 tablet by mouth twice daily    multivitamin (ONE A DAY) tablet 1 Tablet, Oral, DAILY    omega 3-dha-epa-fish oil (Fish Oil) 100-160-1,000 mg cap 1 Capsule, Oral, DAILY    ONETOUCH ULTRA BLUE TEST STRIP strip USE 1 STRIP TO CHECK GLUCOSE ONCE DAILY PATIENT NEED APPOINTMENT    phentermine (ADIPEX-P) 37.5 mg tablet Take 1 tablet by mouth once daily    Victoza 2-Gilberto 0.6 mg/0.1 mL (18 mg/3 mL) pnij INJECT 1.8 MG  SUBCUTANEOUSLY ONCE A DAY        PHYSICAL EXAM  Physical Exam  Constitutional:       Appearance: He is obese. HENT:      Right Ear: Tympanic membrane, ear canal and external ear normal.      Left Ear: External ear normal. There is impacted cerumen. Cardiovascular:      Heart sounds: Normal heart sounds. Pulmonary:      Breath sounds: Normal breath sounds. Neurological:      Mental Status: He is alert. Assessment/Plan:     Diagnoses and all orders for this visit:    1. Otalgia of left ear    2. Impacted cerumen of left ear      Left ear irrigated copiously by nursing staff however she was unable to loosen any of the impacted cerumen. I attempted to loosen the impacted cerumen with a curette however it is excessively hard. I advised patient to start Debrox today for the next 3 to 4 days come by the office for reassessment he verbalized understanding.   Follow-up and Dispositions    · Return in about 3 months (around 12/17/2021). Disclaimer:    I have discussed the diagnosis with the patient and the intended plan as seen above. The patient understands our medical plan. The risks, benefits and significant side effects of all medications have been reviewed. Anticipated time course and progression of condition reviewed. All questions have been addressed. He received an after visit summary, with information reviewed, and questions answered. Where appropriate, he is instructed to call the clinic if he has not been notified either by phone or through [phone] with the results of his tests or with an appointment plan for any referrals within 1 week(s). The patient  is to call if his condition worsens or fails to improve or if significant side effects are experienced.        Andrade Rosenbaum NP

## 2021-09-17 NOTE — PROGRESS NOTES
Ion Escoto presents today for   Chief Complaint   Patient presents with    Follow-up       Is someone accompanying this pt? No    Is the patient using any DME equipment during OV? No    Depression Screening:  3 most recent PHQ Screens 9/17/2021   Little interest or pleasure in doing things Not at all   Feeling down, depressed, irritable, or hopeless Not at all   Total Score PHQ 2 0       Learning Assessment:  Learning Assessment 2/18/2021   PRIMARY LEARNER Patient   HIGHEST LEVEL OF EDUCATION - PRIMARY LEARNER  GRADUATED HIGH SCHOOL OR GED   BARRIERS PRIMARY LEARNER NONE   CO-LEARNER CAREGIVER No   PRIMARY LANGUAGE ENGLISH   LEARNER PREFERENCE PRIMARY LISTENING   ANSWERED BY patient   RELATIONSHIP SELF       Health Maintenance reviewed and discussed and ordered per Provider. Health Maintenance Due   Topic Date Due    Foot Exam Q1  Never done    MICROALBUMIN Q1  Never done    Eye Exam Retinal or Dilated  Never done    COVID-19 Vaccine (1) Never done    DTaP/Tdap/Td series (1 - Tdap) Never done    Shingrix Vaccine Age 50> (1 of 2) Never done    Flu Vaccine (1) Never done   . Coordination of Care:  1. Have you been to the ER, urgent care clinic since your last visit? Hospitalized since your last visit? No    2. Have you seen or consulted any other health care providers outside of the [address] [place] [address] since your last visit? Include any pap smears or colon screening.  No

## 2021-09-20 RX ORDER — ACETIC ACID 20.65 MG/ML
4 SOLUTION AURICULAR (OTIC) 3 TIMES DAILY
Qty: 15 ML | Refills: 0 | Status: SHIPPED | OUTPATIENT
Start: 2021-09-20 | End: 2021-09-27

## 2021-09-28 RX ORDER — LISINOPRIL AND HYDROCHLOROTHIAZIDE 20; 25 MG/1; MG/1
TABLET ORAL
Qty: 90 TABLET | Refills: 0 | Status: SHIPPED | OUTPATIENT
Start: 2021-09-28 | End: 2021-12-28

## 2021-09-28 RX ORDER — AMLODIPINE BESYLATE 5 MG/1
TABLET ORAL
Qty: 90 TABLET | Refills: 0 | Status: SHIPPED | OUTPATIENT
Start: 2021-09-28 | End: 2021-12-28

## 2021-12-17 ENCOUNTER — OFFICE VISIT (OUTPATIENT)
Dept: FAMILY MEDICINE CLINIC | Age: 61
End: 2021-12-17
Payer: COMMERCIAL

## 2021-12-17 VITALS
HEART RATE: 78 BPM | HEIGHT: 72 IN | BODY MASS INDEX: 38.47 KG/M2 | RESPIRATION RATE: 18 BRPM | OXYGEN SATURATION: 98 % | WEIGHT: 284 LBS | DIASTOLIC BLOOD PRESSURE: 88 MMHG | SYSTOLIC BLOOD PRESSURE: 132 MMHG

## 2021-12-17 DIAGNOSIS — E11.9 TYPE 2 DIABETES MELLITUS WITHOUT COMPLICATION, UNSPECIFIED WHETHER LONG TERM INSULIN USE (HCC): Primary | ICD-10-CM

## 2021-12-17 DIAGNOSIS — I10 ESSENTIAL HYPERTENSION: ICD-10-CM

## 2021-12-17 DIAGNOSIS — E66.01 MORBID OBESITY (HCC): ICD-10-CM

## 2021-12-17 DIAGNOSIS — Z00.00 WELLNESS EXAMINATION: ICD-10-CM

## 2021-12-17 PROCEDURE — 99396 PREV VISIT EST AGE 40-64: CPT | Performed by: NURSE PRACTITIONER

## 2021-12-17 PROCEDURE — 3051F HG A1C>EQUAL 7.0%<8.0%: CPT | Performed by: NURSE PRACTITIONER

## 2021-12-17 PROCEDURE — 99213 OFFICE O/P EST LOW 20 MIN: CPT | Performed by: NURSE PRACTITIONER

## 2021-12-17 RX ORDER — PHENTERMINE HYDROCHLORIDE 37.5 MG/1
37.5 TABLET ORAL
Qty: 30 TABLET | Refills: 2 | Status: SHIPPED | OUTPATIENT
Start: 2021-12-17 | End: 2022-03-15

## 2021-12-17 NOTE — PROGRESS NOTES
Anthony Baez is a 62 y.o.male presents with   Chief Complaint   Patient presents with    Follow-up     81-year-old male presents today in office for annual adult preventive exam.  He has history significant for diabetes, mixed hyperlipidemia, essential hypertension blood pressure well-controlled on current medication regimen. Today patient request to restart phentermine for weight loss. Subjective:           Past Medical History:   Diagnosis Date    [facility] ([facility]     Low testosterone     Male erectile dysfunction     Obesity (BMI 30-39. 9)     Prostate cancer ([facility]     [id number] [place] 7 (3+4) adenocarcinoma of the prostate in 7/12 cores involving 20-70% of each specimen and [place] 6 disease in 5/12 cores involving 3-50% of each specimen     Past Surgical History:   Procedure Laterality Date    HX APPENDECTOMY      HX PROSTATECTOMY  2016    [facility] Dr Huston     Social History     Socioeconomic History    Marital status:    Tobacco Use    Smoking status: Former Smoker     Years: 10.00     Quit date: 2010     Years since quittin.8    Smokeless tobacco: Never Used   Vaping Use    Vaping Use: Never used   Substance and Sexual Activity    Alcohol use: Yes     Alcohol/week: 0.0 standard drinks    Drug use: Never     Current Outpatient Medications   Medication Sig Dispense Refill    phentermine (ADIPEX-P) 37.5 mg tablet Take 1 Tablet by mouth every morning.  Max Daily Amount: 37.5 mg. 30 Tablet 2    atorvastatin (LIPITOR) 10 mg tablet Take 1 tablet by mouth once daily 90 Tablet 1    Victoza 2-Gilberto 0.6 mg/0.1 mL (18 mg/3 mL) pnij INJECT 1.8 MG SUBCUTANEOUSLY ONCE A DAY 9 mL 3    lisinopril-hydroCHLOROthiazide (PRINZIDE, ZESTORETIC) 20-25 mg per tablet Take 1 tablet by mouth once daily 90 Tablet 0    amLODIPine (NORVASC) 5 mg tablet Take 1 tablet by mouth once daily 90 Tablet 0    metFORMIN (GLUCOPHAGE) 1,000 mg tablet Take 1 tablet by mouth twice daily 180 Tablet 1    Farxiga 10 mg tab tablet Take 1 tablet by mouth once daily 90 Tablet 1    Insulin Needles, Disposable, 32 gauge x 5/32\" ndle USE 1  SUBCUTANEOUSLY ONCE DAILY FOR  VICTOZA  INJECTIONS 100 Pen Needle 5    omega 3-dha-epa-fish oil (Fish Oil) 100-160-1,000 mg cap Take 1 Cap by mouth daily.  ONETOUCH ULTRA BLUE TEST STRIP strip USE 1 STRIP TO CHECK GLUCOSE ONCE DAILY PATIENT NEED APPOINTMENT  5    multivitamin (ONE A DAY) tablet Take 1 Tab by mouth daily.  aspirin delayed-release 81 mg tablet Take  by mouth daily.  phentermine (ADIPEX-P) 37.5 mg tablet Take 1 tablet by mouth once daily 30 Tablet 2     Allergies   Allergen Reactions    Pcn [Penicillins] Other (comments)     The patient has a family history of    REVIEW OF SYSTEMS  Review of Systems   Constitutional: Negative for chills and fever. HENT: Negative for ear discharge, ear pain, hearing loss, sinus pain and sore throat. Eyes: Negative for pain. Respiratory: Negative for cough and shortness of breath. Cardiovascular: Negative for chest pain, palpitations and leg swelling. Gastrointestinal: Negative for abdominal pain, nausea and vomiting. Genitourinary: Negative for dysuria, frequency and urgency. Musculoskeletal: Negative for falls, myalgias and neck pain. Skin: Negative for rash. Neurological: Negative for dizziness, tingling, tremors and headaches. Psychiatric/Behavioral: Negative for depression, substance abuse and suicidal ideas. The patient is not nervous/anxious and does not have insomnia.         Objective:     Visit Vitals  /88 (BP 1 Location: Right upper arm, BP Patient Position: Sitting, BP Cuff Size: Adult)   Pulse 78   Resp 18   Ht 6' (1.829 m)   Wt 284 lb (128.8 kg)   SpO2 98%   BMI 38.52 kg/m²       Current Outpatient Medications   Medication Instructions    amLODIPine (NORVASC) 5 mg tablet Take 1 tablet by mouth once daily    aspirin delayed-release 81 mg tablet Oral, DAILY    atorvastatin (LIPITOR) 10 mg tablet Take 1 tablet by mouth once daily    Farxiga 10 mg tab tablet Take 1 tablet by mouth once daily    Insulin Needles, Disposable, 32 gauge x 5/32\" ndle USE 1  SUBCUTANEOUSLY ONCE DAILY FOR  VICTOZA  INJECTIONS    lisinopril-hydroCHLOROthiazide (PRINZIDE, ZESTORETIC) 20-25 mg per tablet Take 1 tablet by mouth once daily    metFORMIN (GLUCOPHAGE) 1,000 mg tablet Take 1 tablet by mouth twice daily    multivitamin (ONE A DAY) tablet 1 Tablet, Oral, DAILY    omega 3-dha-epa-fish oil (Fish Oil) 100-160-1,000 mg cap 1 Capsule, Oral, DAILY    ONETOUCH ULTRA BLUE TEST STRIP strip USE 1 STRIP TO CHECK GLUCOSE ONCE DAILY PATIENT NEED APPOINTMENT    phentermine (ADIPEX-P) 37.5 mg tablet Take 1 tablet by mouth once daily    phentermine (ADIPEX-P) 37.5 mg, Oral, 7AM    Victoza 2-Gilberto 0.6 mg/0.1 mL (18 mg/3 mL) pnij INJECT 1.8 MG SUBCUTANEOUSLY ONCE A DAY        PHYSICAL EXAM  Physical Exam  Constitutional:       Appearance: He is obese. HENT:      Head: Normocephalic. Right Ear: External ear normal.      Left Ear: External ear normal.   Eyes:      General: No scleral icterus. Conjunctiva/sclera: Conjunctivae normal.   Cardiovascular:      Rate and Rhythm: Normal rate and regular rhythm. Pulses: Normal pulses. Heart sounds: Normal heart sounds. No murmur heard. Pulmonary:      Effort: Pulmonary effort is normal. No respiratory distress. Breath sounds: Normal breath sounds. Musculoskeletal:         General: No swelling or tenderness. Cervical back: Normal range of motion. Skin:     General: Skin is warm and dry. Findings: No erythema. Neurological:      Mental Status: He is alert and oriented to person, place, and time. Psychiatric:         Mood and Affect: Mood normal.         Behavior: Behavior normal.         Thought Content: Thought content normal.         Judgment: Judgment normal.         Assessment/Plan:     Diagnoses and all orders for this visit:    1.  Type 2 diabetes mellitus without complication, unspecified whether long term insulin use (HCC)  -     HEMOGLOBIN A1C WITH EAG  -     MICROALBUMIN, UR, RAND W/ MICROALB/CREAT RATIO  Switching patient over to [address] first administration done here in the office I relayed to the patient that this will hopefully help with further weight loss even after he completes his 3 months. Of phentermine. Patient verbalized understanding. 2. Morbid obesity ([address]  -     phentermine (ADIPEX-P) 37.5 mg tablet; Take 1 Tablet by mouth every morning. Max Daily Amount: 37.5 mg.    3. Wellness examination  -     CBC W/O DIFF  -     LIPID PANEL  -     METABOLIC PANEL, COMPREHENSIVE  -     VITAMIN D, 25 HYDROXY  -     VITAMIN B12  -     TSH 3RD GENERATION        Follow-up and Dispositions    · Return in about 3 months (around 3/17/2022) for in office, weight loss. Disclaimer:    I have discussed the diagnosis with the patient and the intended plan as seen above. The patient understands our medical plan. The risks, benefits and significant side effects of all medications have been reviewed. Anticipated time course and progression of condition reviewed. All questions have been addressed. He received an after visit summary, with information reviewed, and questions answered. Where appropriate, he is instructed to call the clinic if he has not been notified either by phone or through [address] with the results of his tests or with an appointment plan for any referrals within 1 week(s). The patient  is to call if his condition worsens or fails to improve or if significant side effects are experienced.        Rafy Coronado NP

## 2021-12-17 NOTE — PROGRESS NOTES
Tyrese Cronin presents today for   Chief Complaint   Patient presents with    Follow-up       Is someone accompanying this pt? no    Is the patient using any DME equipment during OV? No    Depression Screening:  3 most recent PHQ Screens 12/17/2021   Little interest or pleasure in doing things Not at all   Feeling down, depressed, irritable, or hopeless Not at all   Total Score PHQ 2 0       Learning Assessment:  Learning Assessment 2/18/2021   PRIMARY LEARNER Patient   HIGHEST LEVEL OF EDUCATION - PRIMARY LEARNER  GRADUATED HIGH SCHOOL OR GED   BARRIERS PRIMARY LEARNER NONE   CO-LEARNER CAREGIVER No   PRIMARY LANGUAGE ENGLISH   LEARNER PREFERENCE PRIMARY LISTENING   ANSWERED BY patient   RELATIONSHIP SELF       Health Maintenance reviewed and discussed and ordered per Provider. Health Maintenance Due   Topic Date Due    COVID-19 Vaccine (1) Never done    Foot Exam Q1  Never done    MICROALBUMIN Q1  Never done    Eye Exam Retinal or Dilated  Never done    DTaP/Tdap/Td series (1 - Tdap) Never done    Shingrix Vaccine Age 50> (1 of 2) Never done    Flu Vaccine (1) Never done   . Coordination of Care:  1. Have you been to the ER, urgent care clinic since your last visit? Hospitalized since your last visit? No    2. Have you seen or consulted any other health care providers outside of the [address] [place] [address] since your last visit? Include any pap smears or colon screening.  No

## 2021-12-22 RX ORDER — SEMAGLUTIDE 1.34 MG/ML
0.5 INJECTION, SOLUTION SUBCUTANEOUS
Qty: 1 BOX | Refills: 0 | Status: SHIPPED | COMMUNITY
Start: 2021-12-22 | End: 2022-03-09 | Stop reason: SDUPTHER

## 2022-02-07 DIAGNOSIS — E11.9 TYPE 2 DIABETES MELLITUS WITHOUT COMPLICATION, WITHOUT LONG-TERM CURRENT USE OF INSULIN (HCC): ICD-10-CM

## 2022-02-07 RX ORDER — DAPAGLIFLOZIN 10 MG/1
TABLET, FILM COATED ORAL
Qty: 90 TABLET | Refills: 0 | Status: SHIPPED | OUTPATIENT
Start: 2022-02-07 | End: 2022-05-11

## 2022-02-14 RX ORDER — METFORMIN HYDROCHLORIDE 1000 MG/1
TABLET ORAL
Qty: 180 TABLET | Refills: 0 | Status: SHIPPED | OUTPATIENT
Start: 2022-02-14 | End: 2022-05-17

## 2022-03-09 ENCOUNTER — TELEPHONE (OUTPATIENT)
Dept: FAMILY MEDICINE CLINIC | Age: 62
End: 2022-03-09

## 2022-03-09 RX ORDER — SEMAGLUTIDE 1.34 MG/ML
0.5 INJECTION, SOLUTION SUBCUTANEOUS
Qty: 1 BOX | Refills: 0 | Status: SHIPPED | OUTPATIENT
Start: 2022-03-09 | End: 2022-09-16

## 2022-03-09 NOTE — TELEPHONE ENCOUNTER
[facility] in [place]. He needs a refill of his shot for his sugar. He takes it on Friday. He couldn't think of the name. He said he got samples last time. I'm assuming Ozempic. He said he's out.

## 2022-03-15 DIAGNOSIS — E66.01 MORBID OBESITY (HCC): ICD-10-CM

## 2022-03-15 RX ORDER — PHENTERMINE HYDROCHLORIDE 37.5 MG/1
TABLET ORAL
Qty: 30 TABLET | Refills: 0 | Status: SHIPPED | OUTPATIENT
Start: 2022-03-15 | End: 2022-04-15

## 2022-03-24 ENCOUNTER — OFFICE VISIT (OUTPATIENT)
Dept: FAMILY MEDICINE CLINIC | Age: 62
End: 2022-03-24
Payer: COMMERCIAL

## 2022-03-24 VITALS
RESPIRATION RATE: 20 BRPM | OXYGEN SATURATION: 98 % | WEIGHT: 288.4 LBS | BODY MASS INDEX: 39.06 KG/M2 | TEMPERATURE: 97.5 F | SYSTOLIC BLOOD PRESSURE: 130 MMHG | HEART RATE: 81 BPM | HEIGHT: 72 IN | DIASTOLIC BLOOD PRESSURE: 88 MMHG

## 2022-03-24 DIAGNOSIS — I10 ESSENTIAL HYPERTENSION: ICD-10-CM

## 2022-03-24 DIAGNOSIS — E11.9 TYPE 2 DIABETES MELLITUS WITHOUT COMPLICATION, UNSPECIFIED WHETHER LONG TERM INSULIN USE (HCC): ICD-10-CM

## 2022-03-24 DIAGNOSIS — E66.01 MORBID OBESITY (HCC): Primary | ICD-10-CM

## 2022-03-24 DIAGNOSIS — E66.01 SEVERE OBESITY (BMI 35.0-39.9) WITH COMORBIDITY (HCC): ICD-10-CM

## 2022-03-24 PROCEDURE — 99213 OFFICE O/P EST LOW 20 MIN: CPT | Performed by: NURSE PRACTITIONER

## 2022-03-24 RX ORDER — SEMAGLUTIDE 1.34 MG/ML
1 INJECTION, SOLUTION SUBCUTANEOUS
Qty: 3 EACH | Refills: 2 | Status: SHIPPED | OUTPATIENT
Start: 2022-03-24 | End: 2022-06-29

## 2022-03-24 NOTE — PROGRESS NOTES
Chilango Nelson is a 62 y.o.male presents with   Chief Complaint   Patient presents with    Follow-up     weight loss       61-year-old male presents today in office for 3-month follow-up after starting Ozempic for diabetes. Patient states he is tolerating medication without difficulty. Subjective:           Past Medical History:   Diagnosis Date    Diabetes ([facility]     Low testosterone     Male erectile dysfunction     Obesity (BMI 30-39. 9)     [facility] ([facility]     [id number] [place] 7 (3+4) adenocarcinoma of the prostate in 7/12 cores involving 20-70% of each specimen and [place] 6 disease in 5/12 cores involving 3-50% of each specimen     Past Surgical History:   Procedure Laterality Date    HX APPENDECTOMY      HX PROSTATECTOMY  2016    D[facility] Dr Huston     Social History     Socioeconomic History    Marital status:    Tobacco Use    Smoking status: Former Smoker     Years: 10.00     Quit date: 2010     Years since quittin.1    Smokeless tobacco: Never Used   Vaping Use    Vaping Use: Never used   Substance and Sexual Activity    Alcohol use: Yes     Alcohol/week: 0.0 standard drinks    Drug use: Never     Current Outpatient Medications   Medication Sig Dispense Refill    semaglutide (Ozempic) 1 mg/dose (4 mg/3 mL) pnij 1 mg by SubCUTAneous route every seven (7) days. 3 Each 2    phentermine (ADIPEX-P) 37.5 mg tablet TAKE 1 TABLET BY MOUTH ONCE DAILY IN THE MORNING (MAX  DAILY  AMOUNT  37. 5MG) 30 Tablet 0    semaglutide (Ozempic) 0.25 mg or 0.5 mg/dose (2 mg/1.5 ml) subq pen 0.5 mg by SubCUTAneous route every seven (7) days.  1 Box 0    metFORMIN (GLUCOPHAGE) 1,000 mg tablet Take 1 tablet by mouth twice daily 180 Tablet 0    Farxiga 10 mg tab tablet Take 1 tablet by mouth once daily 90 Tablet 0    lisinopril-hydroCHLOROthiazide (PRINZIDE, ZESTORETIC) 20-25 mg per tablet Take 1 tablet by mouth once daily 90 Tablet 1    amLODIPine (NORVASC) 5 mg tablet Take 1 tablet by mouth once daily 90 Tablet 1    atorvastatin (LIPITOR) 10 mg tablet Take 1 tablet by mouth once daily 90 Tablet 1    phentermine (ADIPEX-P) 37.5 mg tablet Take 1 tablet by mouth once daily 30 Tablet 2    Insulin Needles, Disposable, 32 gauge x 5/32\" ndle USE 1  SUBCUTANEOUSLY ONCE DAILY FOR  VICTOZA  INJECTIONS 100 Pen Needle 5    omega 3-dha-epa-fish oil (Fish Oil) 100-160-1,000 mg cap Take 1 Cap by mouth daily.  ONETOUCH ULTRA BLUE TEST STRIP strip USE 1 STRIP TO CHECK GLUCOSE ONCE DAILY PATIENT NEED APPOINTMENT  5    multivitamin (ONE A DAY) tablet Take 1 Tab by mouth daily.  aspirin delayed-release 81 mg tablet Take  by mouth daily. Allergies   Allergen Reactions    Pcn [Penicillins] Other (comments)     The patient has a family history of    REVIEW OF SYSTEMS  Review of Systems   Respiratory: Negative for shortness of breath. Cardiovascular: Negative for chest pain and palpitations. Neurological: Negative for dizziness, tremors and headaches.        Objective:     Visit Vitals  /88   Pulse 81   Temp 97.5 °F (36.4 °C)   Resp 20   Ht 6' (1.829 m)   Wt 288 lb 6.4 oz (130.8 kg)   SpO2 98%   BMI 39.11 kg/m²       Current Outpatient Medications   Medication Instructions    amLODIPine (NORVASC) 5 mg tablet Take 1 tablet by mouth once daily    aspirin delayed-release 81 mg tablet Oral, DAILY    atorvastatin (LIPITOR) 10 mg tablet Take 1 tablet by mouth once daily    Farxiga 10 mg tab tablet Take 1 tablet by mouth once daily    Insulin Needles, Disposable, 32 gauge x 5/32\" ndle USE 1  SUBCUTANEOUSLY ONCE DAILY FOR  VICTOZA  INJECTIONS    lisinopril-hydroCHLOROthiazide (PRINZIDE, ZESTORETIC) 20-25 mg per tablet Take 1 tablet by mouth once daily    metFORMIN (GLUCOPHAGE) 1,000 mg tablet Take 1 tablet by mouth twice daily    multivitamin (ONE A DAY) tablet 1 Tablet, Oral, DAILY    omega 3-dha-epa-fish oil (Fish Oil) 100-160-1,000 mg cap 1 Capsule, Oral, DAILY    ONETOUCH ULTRA BLUE TEST STRIP strip USE 1 STRIP TO CHECK GLUCOSE ONCE DAILY PATIENT NEED APPOINTMENT    Ozempic 0.5 mg, SubCUTAneous, EVERY 7 DAYS    Ozempic 1 mg, SubCUTAneous, EVERY 7 DAYS    phentermine (ADIPEX-P) 37.5 mg tablet Take 1 tablet by mouth once daily    phentermine (ADIPEX-P) 37.5 mg tablet TAKE 1 TABLET BY MOUTH ONCE DAILY IN THE MORNING (MAX  DAILY  AMOUNT  37. 5MG)        PHYSICAL EXAM  Physical Exam  Constitutional:       Appearance: He is obese. Neurological:      Mental Status: He is alert and oriented to person, place, and time. Assessment/Plan:     Diagnoses and all orders for this visit:    1. Morbid obesity ([facility]    2. Severe obesity (BMI 35.0-39. 9) with comorbidity ([facility]    3. Type 2 diabetes mellitus without complication, unspecified whether long term insulin use ([facility]    4. Essential hypertension    Other orders  -     semaglutide (Ozempic) 1 mg/dose (4 mg/3 mL) pnij; 1 mg by SubCUTAneous route every seven (7) days. The current medical regimen is effective;  continue present plan and medications. Follow-up and Dispositions    · Return in about 6 months (around 9/24/2022). Disclaimer:    I have discussed the diagnosis with the patient and the intended plan as seen above. The patient understands our medical plan. The risks, benefits and significant side effects of all medications have been reviewed. Anticipated time course and progression of condition reviewed. All questions have been addressed. He received an after visit summary, with information reviewed, and questions answered. Where appropriate, he is instructed to call the clinic if he has not been notified either by phone or through [address] with the results of his tests or with an appointment plan for any referrals within 1 week(s). The patient  is to call if his condition worsens or fails to improve or if significant side effects are experienced.        Nasima Storm NP

## 2022-03-24 NOTE — PROGRESS NOTES
Colleen Mir presents today for   Chief Complaint   Patient presents with    Follow-up     weight loss       Is someone accompanying this pt? no  Is the patient using any DME equipment during [address]? no    Depression Screening:  3 most recent PHQ Screens 3/24/2022   Little interest or pleasure in doing things Not at all   Feeling down, depressed, irritable, or hopeless Not at all   Total Score PHQ 2 0       Learning Assessment:  Learning Assessment 2/18/2021   PRIMARY LEARNER Patient   HIGHEST LEVEL OF EDUCATION - PRIMARY LEARNER  GRADUATED HIGH SCHOOL OR GED   BARRIERS PRIMARY LEARNER NONE   CO-LEARNER CAREGIVER No   PRIMARY LANGUAGE ENGLISH   LEARNER PREFERENCE PRIMARY LISTENING   ANSWERED BY patient   RELATIONSHIP SELF       Fall Risk  No flowsheet data found. ADL  ADL Assessment 3/24/2022   Feeding yourself No Help Needed   Getting from bed to chair No Help Needed   Getting dressed No Help Needed   Bathing or showering No Help Needed   Walk across the room (includes cane/walker) No Help Needed   Using the telphone No Help Needed   Taking your medications No Help Needed   Preparing meals No Help Needed   Managing money (expenses/bills) No Help Needed   Moderately strenuous housework (laundry) No Help Needed   Shopping for personal items (toiletries/medicines) No Help Needed   Shopping for groceries No Help Needed   Driving No Help Needed   Climbing a flight of stairs No Help Needed   Getting to places beyond walking distances No Help Needed       Health Maintenance reviewed and discussed and ordered per Provider. Health Maintenance Due   Topic Date Due    COVID-19 Vaccine (1) Never done    Pneumococcal 0-64 years (1 of 2 - PPSV23) Never done    Foot Exam Q1  Never done    MICROALBUMIN Q1  Never done    Eye Exam Retinal or Dilated  Never done    DTaP/Tdap/Td series (1 - Tdap) Never done    Shingrix Vaccine Age 50> (1 of 2) Never done    Flu Vaccine (1) Never done   . Coordination of Care:  1. \"Have you been to the ER, urgent care clinic since your last visit? Hospitalized since your last visit? \" No    2. \"Have you seen or consulted any other health care providers outside of the [address] [place] [address] since your last visit? \" No     3. For patients aged 39-70: Has the patient had a colonoscopy? No     If the patient is female:    4. For patients aged 41-77: Has the patient had a mammogram within the past 2 years? NA - based on age    11. For patients aged 21-65: Has the patient had a pap smear?  NA - based on age

## 2022-04-15 DIAGNOSIS — E66.01 MORBID OBESITY (HCC): ICD-10-CM

## 2022-04-15 RX ORDER — PHENTERMINE HYDROCHLORIDE 37.5 MG/1
TABLET ORAL
Qty: 30 TABLET | Refills: 0 | Status: SHIPPED | OUTPATIENT
Start: 2022-04-15 | End: 2022-05-17

## 2022-05-11 DIAGNOSIS — E11.9 TYPE 2 DIABETES MELLITUS WITHOUT COMPLICATION, WITHOUT LONG-TERM CURRENT USE OF INSULIN (HCC): ICD-10-CM

## 2022-05-11 RX ORDER — DAPAGLIFLOZIN 10 MG/1
TABLET, FILM COATED ORAL
Qty: 90 TABLET | Refills: 0 | Status: SHIPPED | OUTPATIENT
Start: 2022-05-11 | End: 2022-08-15

## 2022-09-07 DIAGNOSIS — E66.01 MORBID OBESITY (HCC): ICD-10-CM

## 2022-09-09 RX ORDER — PHENTERMINE HYDROCHLORIDE 37.5 MG/1
TABLET ORAL
Qty: 30 TABLET | Refills: 0 | Status: SHIPPED | OUTPATIENT
Start: 2022-09-09

## 2022-09-16 ENCOUNTER — OFFICE VISIT (OUTPATIENT)
Dept: INTERNAL MEDICINE CLINIC | Age: 62
End: 2022-09-16
Payer: COMMERCIAL

## 2022-09-16 ENCOUNTER — HOSPITAL ENCOUNTER (OUTPATIENT)
Dept: GENERAL RADIOLOGY | Age: 62
Discharge: HOME | End: 2022-09-16
Attending: FAMILY MEDICINE
Payer: COMMERCIAL

## 2022-09-16 VITALS
OXYGEN SATURATION: 95 % | SYSTOLIC BLOOD PRESSURE: 113 MMHG | TEMPERATURE: 97.8 F | HEIGHT: 72 IN | HEART RATE: 84 BPM | BODY MASS INDEX: 37.65 KG/M2 | RESPIRATION RATE: 20 BRPM | WEIGHT: 278 LBS | DIASTOLIC BLOOD PRESSURE: 75 MMHG

## 2022-09-16 DIAGNOSIS — Z23 ENCOUNTER FOR IMMUNIZATION: ICD-10-CM

## 2022-09-16 DIAGNOSIS — E11.9 TYPE 2 DIABETES MELLITUS WITHOUT COMPLICATION, UNSPECIFIED WHETHER LONG TERM INSULIN USE (HCC): ICD-10-CM

## 2022-09-16 DIAGNOSIS — I10 HYPERTENSION, UNSPECIFIED TYPE: ICD-10-CM

## 2022-09-16 DIAGNOSIS — E66.01 MORBID OBESITY WITH BMI OF 45.0-49.9, ADULT (HCC): ICD-10-CM

## 2022-09-16 DIAGNOSIS — R31.29 MICROSCOPIC HEMATURIA: ICD-10-CM

## 2022-09-16 DIAGNOSIS — S39.012A LUMBAR STRAIN, INITIAL ENCOUNTER: ICD-10-CM

## 2022-09-16 DIAGNOSIS — S39.012A LUMBAR STRAIN, INITIAL ENCOUNTER: Primary | ICD-10-CM

## 2022-09-16 LAB
BILIRUB UR QL STRIP: NEGATIVE
GLUCOSE UR-MCNC: NORMAL MG/DL
HBA1C MFR BLD HPLC: 7.9 %
KETONES P FAST UR STRIP-MCNC: NEGATIVE MG/DL
PH UR STRIP: 5.5 [PH] (ref 4.6–8)
PROT UR QL STRIP: NEGATIVE
SP GR UR STRIP: 1.02 (ref 1–1.03)
UA UROBILINOGEN AMB POC: NORMAL (ref 0.2–1)
URINALYSIS CLARITY POC: CLEAR
URINALYSIS COLOR POC: YELLOW
URINE BLOOD POC: NORMAL
URINE LEUKOCYTES POC: NEGATIVE
URINE NITRITES POC: NEGATIVE

## 2022-09-16 PROCEDURE — 99214 OFFICE O/P EST MOD 30 MIN: CPT | Performed by: FAMILY MEDICINE

## 2022-09-16 PROCEDURE — 81003 URINALYSIS AUTO W/O SCOPE: CPT | Performed by: FAMILY MEDICINE

## 2022-09-16 PROCEDURE — 83036 HEMOGLOBIN GLYCOSYLATED A1C: CPT | Performed by: FAMILY MEDICINE

## 2022-09-16 PROCEDURE — 36416 COLLJ CAPILLARY BLOOD SPEC: CPT | Performed by: FAMILY MEDICINE

## 2022-09-16 PROCEDURE — 90471 IMMUNIZATION ADMIN: CPT | Performed by: FAMILY MEDICINE

## 2022-09-16 PROCEDURE — 72100 X-RAY EXAM L-S SPINE 2/3 VWS: CPT

## 2022-09-16 PROCEDURE — 90686 IIV4 VACC NO PRSV 0.5 ML IM: CPT | Performed by: FAMILY MEDICINE

## 2022-09-16 PROCEDURE — 3051F HG A1C>EQUAL 7.0%<8.0%: CPT | Performed by: FAMILY MEDICINE

## 2022-09-16 PROCEDURE — 36415 COLL VENOUS BLD VENIPUNCTURE: CPT | Performed by: FAMILY MEDICINE

## 2022-09-16 RX ORDER — DICLOFENAC SODIUM 10 MG/G
GEL TOPICAL
Qty: 100 G | Refills: 0 | Status: SHIPPED | OUTPATIENT
Start: 2022-09-16

## 2022-09-16 RX ORDER — METHOCARBAMOL 500 MG/1
500 TABLET, FILM COATED ORAL
Qty: 30 TABLET | Refills: 0 | Status: SHIPPED | OUTPATIENT
Start: 2022-09-16

## 2022-09-16 RX ORDER — SEMAGLUTIDE 2.68 MG/ML
2 INJECTION, SOLUTION SUBCUTANEOUS
Qty: 1 PEN | Refills: 2 | Status: SHIPPED | OUTPATIENT
Start: 2022-09-16 | End: 2022-10-27 | Stop reason: RX

## 2022-09-16 RX ORDER — LIDOCAINE 50 MG/G
1 PATCH TOPICAL EVERY 24 HOURS
Qty: 1 EACH | Refills: 0 | Status: SHIPPED | OUTPATIENT
Start: 2022-09-16

## 2022-09-16 NOTE — PROGRESS NOTES
Nasario Rubinstein Buhls (: 1960) is a 65 y.o. male here for evaluation of the following chief concern(s):  Establish Care; previously under the care of [facility] (Family Medicine). Acute concerns; Back pain  Chronic condition management       ASSESSMENT/PLAN:  1. Lumbar strain, initial encounter  -     XR SPINE LUMB 2 OR 3 V; Future  -     AMB POC URINALYSIS DIP STICK AUTO W/O MICRO  -     diclofenac (VOLTAREN) 1 % gel; Apply 2 grams three times a day as needed for back pain/inflammation. , Normal, Disp-100 g, R-0  -     lidocaine (LIDODERM) 5 %; 1 Patch by TransDERmal route every twenty-four (24) hours. Apply patch to the affected area for 12 hours a day and remove for 12 hours a day., Normal, Disp-1 Each, R-0  -     methocarbamoL (ROBAXIN) 500 mg tablet; Take 1 Tablet by mouth two (2) times daily as needed for Muscle Spasm(s). Caution, may cause drowsiness/dizziness., Normal, Disp-30 Tablet, R-0  2. Type 2 diabetes mellitus without complication, unspecified whether long term insulin use (HCC)  -     AMB POC HEMOGLOBIN A1C  -     COLLECTION CAPILLARY BLOOD SPECIMEN  -     METABOLIC PANEL, BASIC  -     semaglutide (Ozempic) 2 mg/dose (8 mg/3 mL) pnij; 2 mg by SubCUTAneous route every seven (7) days. , Normal, Disp-1 Pen, R-2  3. Morbid obesity with BMI of 45.0-49.9, adult ([facility] [address]  4. Hypertension, unspecified type  -     COLLECTION VENOUS BLOOD,VENIPUNCTURE  5. Microscopic hematuria  -     URINALYSIS W/ RFLX MICROSCOPIC  6. Encounter for immunization  -     INFLUENZA, FLUARIX, FLULAVAL, FLUZONE (AGE 6 MO+), AFLURIA(AGE 3Y+) IM, PF, 0.5 ML    Mr. Kofi Bowling appears to be doing well, stable hemodynamics, and normotensive. His back pain is likely muscle strain- UA w/ trace blood though lower suspicion for nephrolithiasis and will send out for microscopy, XR ordered to evaluate for bone pathology- note hx of prostate cancer in remission w/ recent PSA undetectable.   We will treat his pain w/ topical NSAID, lidoderm and PRN muscle relaxer. If symptoms do not improve in the next 1 month, or if they worsen I would recommend referral to Ortho-spine and may consider trial of physical therapy. Diabetes is controled; will increase Ozempic to optimize dosing/control, continue [place] and Metformin. Recent lipid panel looks excellent- will continue on low dose Atorvastatin. Continue current antihypertensive regimen- see medications list below, update renal indices/electrolyte levels. Regarding weight management, I dis-recommend continuation of stimulant Phentermine given side effect profile- pt just had this filled and will plan to take every other day then to discontinue- if needed we can transition Ozempic to [facility]. Return in about 3 months (around 12/16/2022) for follow-up chronic conditions. Marisol Sp agrees with plan as above and has no additional questions at this time. SUBJECTIVE/OBJECTIVE:  Overall, pt is feeling \"good\". Back pain, subacute: Onset ~1 month ago while riding lawnmower, heard a \"pop\". Left lower back, can radiate around the flank. Severity is variable, can be moderate+ and have associated ?weakness to the LLE. Can be triggered by some activity/motion or prolonged standing/bending. No constant numbness/weakness to LEs, bilat. DMII:  HA1C  Dec 2021 8.4%> Sept 2022 7.9%  Meds: Metformin 1,000 BID, Farxiga 10mg, Ozempic 1mg qweekly, Atorvastatin 10mg    HTN:  Goal <140/90  Meds: Amlodipine 5mg, lisinopril/HCZT 20-25mg    Body mass index is 37.7 kg/m². 10 Lbs weight loss since March 2022. Hx of prostate cancer s/p prostatectomy in remission    Review of Systems   Constitutional:  Negative for appetite change, chills, fatigue, fever and unexpected weight change. HENT:  Negative for dental problem, hearing loss, rhinorrhea and trouble swallowing. Eyes:  Negative for visual disturbance. Respiratory:  Negative for cough and shortness of breath.     Cardiovascular:  Negative for chest pain, palpitations and leg swelling. Gastrointestinal:  Negative for abdominal pain, blood in stool, constipation, diarrhea, nausea and vomiting. Genitourinary:  Negative for difficulty urinating and hematuria. Musculoskeletal:  Positive for back pain. Skin:  Negative for rash. Neurological:  Negative for dizziness, seizures, syncope and headaches. Hematological:  Negative for adenopathy. Psychiatric/Behavioral:  Negative for dysphoric mood. The patient is not nervous/anxious. Past Medical History:   Diagnosis Date    Diabetes ([facility] [address]     Low testosterone     Male erectile dysfunction     Obesity (BMI 30-39. 9)     [facility]     [id number] [place] 7 (3+4) adenocarcinoma of the prostate in 7/12 cores involving 20-70% of each specimen and [place] 6 disease in 5/12 cores involving 3-50% of each specimen     Past Surgical History:   Procedure Laterality Date    HX APPENDECTOMY      HX PROSTATECTOMY  2016    [facility] Dr Huston     History reviewed. No pertinent family history. Social History     Socioeconomic History    Marital status:      Spouse name: Not on file    Number of children: Not on file    Years of education: Not on file    Highest education level: Not on file   Occupational History    Not on file   Tobacco Use    Smoking status: Former     Years: 10.00     Types: Cigarettes     Quit date: 2010     Years since quittin.6    Smokeless tobacco: Never   Vaping Use    Vaping Use: Never used   Substance and Sexual Activity    Alcohol use:  Yes     Alcohol/week: 0.0 standard drinks    Drug use: Never    Sexual activity: Not on file   Other Topics Concern    Not on file   Social History Narrative    Not on file     Social Determinants of Health     Financial Resource Strain: Not on file   Food Insecurity: Not on file   Transportation Needs: Not on file   Physical Activity: Not on file   Stress: Not on file   Social Connections: Not on file   Intimate Partner Violence: Not on file   Housing Stability: Not on file     Social History     Tobacco Use   Smoking Status Former    Years: 10.00    Types: Cigarettes    Quit date: 2010    Years since quittin.6   Smokeless Tobacco Never       Current Outpatient Medications   Medication Sig Dispense Refill    semaglutide (Ozempic) 2 mg/dose (8 mg/3 mL) pnij 2 mg by SubCUTAneous route every seven (7) days. 1 Pen 2    diclofenac (VOLTAREN) 1 % gel Apply 2 grams three times a day as needed for back pain/inflammation. 100 g 0    lidocaine (LIDODERM) 5 % 1 Patch by TransDERmal route every twenty-four (24) hours. Apply patch to the affected area for 12 hours a day and remove for 12 hours a day. 1 Each 0    methocarbamoL (ROBAXIN) 500 mg tablet Take 1 Tablet by mouth two (2) times daily as needed for Muscle Spasm(s). Caution, may cause drowsiness/dizziness. 30 Tablet 0    phentermine (ADIPEX-P) 37.5 mg tablet TAKE 1 TABLET BY MOUTH ONCE DAILY IN THE MORNING -  DO  NOT  EXCEED  1  PER  24  HOURS 30 Tablet 0    Farxiga 10 mg tab tablet Take 1 tablet by mouth once daily 90 Tablet 1    lisinopril-hydroCHLOROthiazide (PRINZIDE, ZESTORETIC) 20-25 mg per tablet Take 1 tablet by mouth once daily 90 Tablet 0    amLODIPine (NORVASC) 5 mg tablet Take 1 tablet by mouth once daily 90 Tablet 0    atorvastatin (LIPITOR) 10 mg tablet Take 1 tablet by mouth once daily 90 Tablet 1    metFORMIN (GLUCOPHAGE) 1,000 mg tablet Take 1 tablet by mouth twice daily 180 Tablet 2    phentermine (ADIPEX-P) 37.5 mg tablet Take 1 tablet by mouth once daily 30 Tablet 2    Insulin Needles, Disposable, 32 gauge x \" ndle USE 1  SUBCUTANEOUSLY ONCE DAILY FOR  VICTOZA  INJECTIONS 100 Pen Needle 5    omega 3-dha-epa-fish oil (Fish Oil) 100-160-1,000 mg cap Take 1 Cap by mouth daily. ONETOUCH ULTRA BLUE TEST STRIP strip USE 1 STRIP TO CHECK GLUCOSE ONCE DAILY PATIENT NEED APPOINTMENT  5    multivitamin (ONE A DAY) tablet Take 1 Tab by mouth daily.       aspirin delayed-release 81 mg tablet Take  by mouth daily. Allergies   Allergen Reactions    Pcn [Penicillins] Other (comments)       /75 (BP 1 Location: Left upper arm, BP Patient Position: Sitting, BP Cuff Size: Adult long)   Pulse 84   Temp 97.8 °F (36.6 °C) (Temporal)   Resp 20   Ht 6' (1.829 m)   Wt 278 lb (126.1 kg)   SpO2 95%   BMI 37.70 kg/m²     Physical Exam  Constitutional:       General: He is not in acute distress. Appearance: Normal appearance. He is not ill-appearing. HENT:      Head: Normocephalic and atraumatic. Nose: Nose normal.      Mouth/Throat:      Mouth: Mucous membranes are moist.      Pharynx: Oropharynx is clear. Eyes:      General: No scleral icterus. Conjunctiva/sclera: Conjunctivae normal.      Pupils: Pupils are equal, round, and reactive to light. Cardiovascular:      Rate and Rhythm: Normal rate and regular rhythm. Pulses: Normal pulses. Heart sounds: Normal heart sounds. Pulmonary:      Effort: Pulmonary effort is normal.      Breath sounds: Normal breath sounds. Abdominal:      General: Bowel sounds are normal.      Palpations: Abdomen is soft. Tenderness: no abdominal tenderness There is no right CVA tenderness or left CVA tenderness. Musculoskeletal:      Cervical back: Neck supple. Right lower leg: No edema. Left lower leg: No edema. Comments: Normal curvature to spine. No mid-line spine tenderness on palpation. Non-tender paraspinal muscles. Mild Left paralumbar pain w/ rotation about hips. Negative strait leg raise. Lymphadenopathy:      Cervical: No cervical adenopathy. Skin:     General: Skin is dry. Findings: No rash. Neurological:      General: No focal deficit present. Mental Status: He is alert and oriented to person, place, and time. Sensory: No sensory deficit. Motor: No weakness.       Gait: Gait normal.   Psychiatric:         Mood and Affect: Mood normal.       No results found for: WBC, WBCLT, HGBPOC, HGB, HGBP, HCTPOC, HCT, PHCT, RBCH, PLT, MCV, HGBEXT, HCTEXT, PLTEXT, HGBEXT, HCTEXT, PLTEXT  Lab Results   Component Value Date/Time    Sodium 141 06/17/2021 11:07 AM    Potassium 4.2 06/17/2021 11:07 AM    Chloride 102 06/17/2021 11:07 AM    CO2 23 06/17/2021 11:07 AM    Glucose 131 (H) 06/17/2021 11:07 AM    BUN 15 06/17/2021 11:07 AM    Creatinine 0.80 06/17/2021 11:07 AM    BUN/Creatinine ratio 19 06/17/2021 11:07 AM    GFR est  06/17/2021 11:07 AM    GFR est non-AA 98 06/17/2021 11:07 AM    Calcium 9.8 06/17/2021 11:07 AM    Bilirubin, total 0.3 06/17/2021 11:07 AM    Alk. phosphatase 70 06/17/2021 11:07 AM    Protein, total 6.6 06/17/2021 11:07 AM    Albumin 4.3 06/17/2021 11:07 AM    A-G Ratio 1.9 06/17/2021 11:07 AM    ALT (SGPT) 22 06/17/2021 11:07 AM    AST (SGOT) 19 06/17/2021 11:07 AM     Lab Results   Component Value Date/Time    Cholesterol, total 95 (L) 06/17/2021 11:07 AM    HDL Cholesterol 28 (L) 06/17/2021 11:07 AM    LDL, calculated 41 06/17/2021 11:07 AM    VLDL, calculated 26 06/17/2021 11:07 AM    Triglyceride 151 (H) 06/17/2021 11:07 AM       On this date 09/16/22 I have spent  >45 minutes reviewing previous notes, test results and face to face with the patient for interview/exam, discussing working diagnosis and treatment plan as well as documenting on the day of the visit. Medical decision making complexity: moderate-high.     Yon Mills MD   Family & Geriatric Medicine

## 2022-09-16 NOTE — PROGRESS NOTES
Patient here today to establish care and left lower back pain that radiates into left buttocks area for about 1 month. Kostas Ballard is a 65 y.o. male who presents for routine immunizations. He denies any symptoms , reactions or allergies that would exclude them from being immunized today. Risks and adverse reactions were discussed and the VIS was given to them. All questions were addressed. He was observed for 15 min post injection. There were no reactions observed. Fingerstick for HBa1C done in right  middle finger by Josh Leyva LPN per order of Dr. Amy Davison after cleaning area with alcohol wipe. Patient tolerated procedure well. Verbal order from Lakhwinder Da Silva MD to order labs/sign and draw them in office    Labs were drawn and sent to [facility] by Josh Leyva LPN:    The following tubes were sent:    0 Lavendar, 0 Red, 1 SST, 0 Urine    Draw site Left forearm. Patient tolerated draw with no distress. Josh Leyva LPN     Kostas Ballard presents today for   Chief Complaint   Patient presents with    Establish Care     Back pain       Is someone accompanying this pt? no    Is the patient using any DME equipment during OV? no    Depression Screening:  3 most recent PHQ Screens 9/16/2022   Little interest or pleasure in doing things Not at all   Feeling down, depressed, irritable, or hopeless Not at all   Total Score PHQ 2 0       Learning Assessment:  Learning Assessment 2/18/2021   PRIMARY LEARNER Patient   HIGHEST LEVEL OF EDUCATION - PRIMARY LEARNER  GRADUATED HIGH SCHOOL OR GED   BARRIERS PRIMARY LEARNER NONE   CO-LEARNER CAREGIVER No   PRIMARY LANGUAGE ENGLISH   LEARNER PREFERENCE PRIMARY LISTENING   ANSWERED BY patient   RELATIONSHIP SELF       Fall Risk  No flowsheet data found.     ADL  ADL Assessment 9/16/2022   Feeding yourself No Help Needed   Getting from bed to chair No Help Needed   Getting dressed No Help Needed   Bathing or showering No Help Needed   Walk across the room (includes cane/walker) No Help Needed   Using the telphone No Help Needed   Taking your medications No Help Needed   Preparing meals No Help Needed   Managing money (expenses/bills) No Help Needed   Moderately strenuous housework (laundry) No Help Needed   Shopping for personal items (toiletries/medicines) No Help Needed   Shopping for groceries No Help Needed   Driving No Help Needed   Climbing a flight of stairs No Help Needed   Getting to places beyond walking distances No Help Needed       Health Maintenance reviewed and discussed and ordered per Provider. Health Maintenance Due   Topic Date Due    COVID-19 Vaccine (1) Never done    Pneumococcal 0-64 years (1 - PCV) Never done    Foot Exam Q1  Never done    MICROALBUMIN Q1  Never done    Eye Exam Retinal or Dilated  Never done    DTaP/Tdap/Td series (1 - Tdap) Never done    Shingrix Vaccine Age 50> (1 of 2) Never done    A1C test (Diabetic or Prediabetic)  06/17/2022    Lipid Screen  06/17/2022    Flu Vaccine (1) Never done   . Coordination of Care:  1. \"Have you been to the ER, urgent care clinic since your last visit? Hospitalized since your last visit? \" No    2. \"Have you seen or consulted any other health care providers outside of the [address] [place] [address] [place] since your last visit? \" No     3. For patients aged 39-70: Has the patient had a colonoscopy? Yes - no Care Gap present     If the patient is female:    4. For patients aged 41-77: Has the patient had a mammogram within the past 2 years? NA - based on age    11. For patients aged 21-65: Has the patient had a pap smear?  NA - based on age

## 2022-09-17 LAB
BUN SERPL-MCNC: 11 MG/DL (ref 8–27)
BUN/CREAT SERPL: 11 (ref 10–24)
CALCIUM SERPL-MCNC: 10.3 MG/DL (ref 8.6–10.2)
CHLORIDE SERPL-SCNC: 98 MMOL/L (ref 96–106)
CO2 SERPL-SCNC: 25 MMOL/L (ref 20–29)
CREAT SERPL-MCNC: 0.98 MG/DL (ref 0.76–1.27)
EGFR: 88 ML/MIN/1.73
GLUCOSE SERPL-MCNC: 156 MG/DL (ref 65–99)
POTASSIUM SERPL-SCNC: 3.9 MMOL/L (ref 3.5–5.2)
SODIUM SERPL-SCNC: 139 MMOL/L (ref 134–144)

## 2022-09-19 LAB
APPEARANCE UR: CLEAR
BILIRUB UR QL STRIP: NEGATIVE
COLOR UR: YELLOW
GLUCOSE UR QL STRIP: ABNORMAL
HGB UR QL STRIP: NEGATIVE
KETONES UR QL STRIP: NEGATIVE
LEUKOCYTE ESTERASE UR QL STRIP: NEGATIVE
MICRO URNS: ABNORMAL
NITRITE UR QL STRIP: NEGATIVE
PH UR STRIP: 5.5 [PH] (ref 5–7.5)
PROT UR QL STRIP: NEGATIVE
SP GR UR STRIP: 1.02 (ref 1–1.03)
UROBILINOGEN UR STRIP-MCNC: 0.2 MG/DL (ref 0.2–1)

## 2022-09-22 ENCOUNTER — VIRTUAL VISIT (OUTPATIENT)
Dept: INTERNAL MEDICINE CLINIC | Age: 62
End: 2022-09-22
Payer: COMMERCIAL

## 2022-09-22 DIAGNOSIS — M54.50 CHRONIC LEFT-SIDED LOW BACK PAIN, UNSPECIFIED WHETHER SCIATICA PRESENT: ICD-10-CM

## 2022-09-22 DIAGNOSIS — M47.816 OSTEOARTHRITIS OF LUMBAR SPINE, UNSPECIFIED SPINAL OSTEOARTHRITIS COMPLICATION STATUS: Primary | ICD-10-CM

## 2022-09-22 DIAGNOSIS — G89.29 CHRONIC LEFT-SIDED LOW BACK PAIN, UNSPECIFIED WHETHER SCIATICA PRESENT: ICD-10-CM

## 2022-09-22 DIAGNOSIS — R31.29 MICROSCOPIC HEMATURIA: ICD-10-CM

## 2022-09-22 PROCEDURE — 99442 PR PHYS/QHP TELEPHONE EVALUATION 11-20 MIN: CPT | Performed by: FAMILY MEDICINE

## 2022-09-22 NOTE — PROGRESS NOTES
Luis Antonio Long presents today for   Chief Complaint   Patient presents with    Follow-up     Back pain    Labs     Discuss results       Depression Screening:  3 most recent PHQ Screens 9/16/2022   Little interest or pleasure in doing things Not at all   Feeling down, depressed, irritable, or hopeless Not at all   Total Score PHQ 2 0       Learning Assessment:  Learning Assessment 2/18/2021   PRIMARY LEARNER Patient   HIGHEST LEVEL OF EDUCATION - PRIMARY LEARNER  GRADUATED HIGH SCHOOL OR GED   BARRIERS PRIMARY LEARNER NONE   CO-LEARNER CAREGIVER No   PRIMARY LANGUAGE ENGLISH   LEARNER PREFERENCE PRIMARY LISTENING   ANSWERED BY patient   RELATIONSHIP SELF       Fall Risk  No flowsheet data found. ADL  ADL Assessment 9/16/2022   Feeding yourself No Help Needed   Getting from bed to chair No Help Needed   Getting dressed No Help Needed   Bathing or showering No Help Needed   Walk across the room (includes cane/walker) No Help Needed   Using the telphone No Help Needed   Taking your medications No Help Needed   Preparing meals No Help Needed   Managing money (expenses/bills) No Help Needed   Moderately strenuous housework (laundry) No Help Needed   Shopping for personal items (toiletries/medicines) No Help Needed   Shopping for groceries No Help Needed   Driving No Help Needed   Climbing a flight of stairs No Help Needed   Getting to places beyond walking distances No Help Needed       Health Maintenance reviewed and discussed and ordered per Provider. Health Maintenance Due   Topic Date Due    COVID-19 Vaccine (1) Never done    Pneumococcal 0-64 years (1 - PCV) Never done    Foot Exam Q1  Never done    Eye Exam Retinal or Dilated  Never done    DTaP/Tdap/Td series (1 - Tdap) Never done    Shingrix Vaccine Age 50> (1 of 2) Never done   . Coordination of Care:  1. \"Have you been to the ER, urgent care clinic since your last visit? Hospitalized since your last visit? \" No    2.  \"Have you seen or consulted any other health care providers outside of the [address] [place] [address] since your last visit? \" No     3. For patients aged 39-70: Has the patient had a colonoscopy? Yes - no Care Gap present     If the patient is female:    4. For patients aged 41-77: Has the patient had a mammogram within the past 2 years? NA - based on age    11. For patients aged 21-65: Has the patient had a pap smear?  NA - based on age No lesions; no rash

## 2022-09-22 NOTE — PROGRESS NOTES
Abena Epperson (: 1960) is a 65 y.o. male here for evaluation of the following chief concern(s):  Follow-up; Back pain     The services today are being conducted using telemedicine which Marisol Snowden has consented to at the time of scheduling. ASSESSMENT/PLAN:  1. Osteoarthritis of lumbar spine, unspecified spinal osteoarthritis complication status  -     REFERRAL TO ORTHOPEDICS  2. Chronic left-sided low back pain, unspecified whether sciatica present  -     REFERRAL TO ORTHOPEDICS  3. Microscopic hematuria    Mr. Ric Ojeda is having persistent back pain, through improved on PRN muscle relaxer, he declines any medication adjustments for pain management at this time. Xray showing degenerative changes; refer to Ortho for further evaluation and management as pt may benefit from advance imaging, corticosteroid injection. Upcoming appt w/ Urology on Oct 26th; pt advised to discuss microscopic hematuria w/ his provider. Return in about 4 weeks (around 10/20/2022) for follow-up chronic conditions. Marisol Snowden agrees with plan as above and has no additional questions at this time. SUBJECTIVE/OBJECTIVE:  Overall, pt is feeling \"good\". Back pain, subacute:  Overall, symptoms are stable to improved at times. Hx;  Onset ~1 month ago while riding lawnmower, heard a \"pop\". Left lower back, can radiate around the flank. Severity is variable, can be moderate+ and have associated ?weakness to the LLE. Can be triggered by some activity/motion or prolonged standing/bending. No constant numbness/weakness to LEs, bilat. Microscopic hematuria:  Hx of prostate cancer s/p prostatectomy in remission. Past Medical History:   Diagnosis Date    Diabetes ([facility] [address]     Low testosterone     Male erectile dysfunction     Obesity (BMI 30-39. 9)     Prostate cancer ([facility] [address]     [id number] [place] 7 (3+4) adenocarcinoma of the prostate in 7/12 cores involving 20-70% of each specimen and [place] 6 disease in  cores involving 3-50% of each specimen     Past Surgical History:   Procedure Laterality Date    HX APPENDECTOMY      HX PROSTATECTOMY  2016    DVP Dr Huston     History reviewed. No pertinent family history. Social History     Socioeconomic History    Marital status:      Spouse name: Not on file    Number of children: Not on file    Years of education: Not on file    Highest education level: Not on file   Occupational History    Not on file   Tobacco Use    Smoking status: Former     Years: 10.00     Types: Cigarettes     Quit date: 2010     Years since quittin.6    Smokeless tobacco: Never   Vaping Use    Vaping Use: Never used   Substance and Sexual Activity    Alcohol use: Yes     Alcohol/week: 0.0 standard drinks    Drug use: Never    Sexual activity: Not on file   Other Topics Concern    Not on file   Social History Narrative    Not on file     Social Determinants of Health     Financial Resource Strain: Not on file   Food Insecurity: Not on file   Transportation Needs: Not on file   Physical Activity: Not on file   Stress: Not on file   Social Connections: Not on file   Intimate Partner Violence: Not on file   Housing Stability: Not on file     Social History     Tobacco Use   Smoking Status Former    Years: 10.00    Types: Cigarettes    Quit date: 2010    Years since quittin.6   Smokeless Tobacco Never       Current Outpatient Medications   Medication Sig Dispense Refill    semaglutide (Ozempic) 2 mg/dose (8 mg/3 mL) pnij 2 mg by SubCUTAneous route every seven (7) days. 1 Pen 2    diclofenac (VOLTAREN) 1 % gel Apply 2 grams three times a day as needed for back pain/inflammation. 100 g 0    lidocaine (LIDODERM) 5 % 1 Patch by TransDERmal route every twenty-four (24) hours. Apply patch to the affected area for 12 hours a day and remove for 12 hours a day.  1 Each 0    methocarbamoL (ROBAXIN) 500 mg tablet Take 1 Tablet by mouth two (2) times daily as needed for Muscle Spasm(s). Caution, may cause drowsiness/dizziness. 30 Tablet 0    phentermine (ADIPEX-P) 37.5 mg tablet TAKE 1 TABLET BY MOUTH ONCE DAILY IN THE MORNING -  DO  NOT  EXCEED  1  PER  24  HOURS 30 Tablet 0    Farxiga 10 mg tab tablet Take 1 tablet by mouth once daily 90 Tablet 1    lisinopril-hydroCHLOROthiazide (PRINZIDE, ZESTORETIC) 20-25 mg per tablet Take 1 tablet by mouth once daily 90 Tablet 0    amLODIPine (NORVASC) 5 mg tablet Take 1 tablet by mouth once daily 90 Tablet 0    atorvastatin (LIPITOR) 10 mg tablet Take 1 tablet by mouth once daily 90 Tablet 1    metFORMIN (GLUCOPHAGE) 1,000 mg tablet Take 1 tablet by mouth twice daily 180 Tablet 2    phentermine (ADIPEX-P) 37.5 mg tablet Take 1 tablet by mouth once daily 30 Tablet 2    Insulin Needles, Disposable, 32 gauge x 5/32\" ndle USE 1  SUBCUTANEOUSLY ONCE DAILY FOR  VICTOZA  INJECTIONS 100 Pen Needle 5    omega 3-dha-epa-fish oil (Fish Oil) 100-160-1,000 mg cap Take 1 Cap by mouth daily. ONETOUCH ULTRA BLUE TEST STRIP strip USE 1 STRIP TO CHECK GLUCOSE ONCE DAILY PATIENT NEED APPOINTMENT  5    multivitamin (ONE A DAY) tablet Take 1 Tab by mouth daily. aspirin delayed-release 81 mg tablet Take  by mouth daily. Allergies   Allergen Reactions    Pcn [Penicillins] Other (comments)       There were no vitals taken for this visit. Physical exam:  GEN: Awake and alert in NAD, baseline voice/speech. PULM: Unlabored respirations, no cough.       No results found for: WBC, WBCLT, HGBPOC, HGB, HGBP, HCTPOC, HCT, PHCT, RBCH, PLT, MCV, HGBEXT, HCTEXT, PLTEXT, HGBEXT, HCTEXT, PLTEXT  Lab Results   Component Value Date/Time    Sodium 139 09/16/2022 11:22 AM    Potassium 3.9 09/16/2022 11:22 AM    Chloride 98 09/16/2022 11:22 AM    CO2 25 09/16/2022 11:22 AM    Glucose 156 (H) 09/16/2022 11:22 AM    BUN 11 09/16/2022 11:22 AM    Creatinine 0.98 09/16/2022 11:22 AM    BUN/Creatinine ratio 11 09/16/2022 11:22 AM    GFR est  06/17/2021 11:07 AM    GFR est non-AA 98 06/17/2021 11:07 AM    Calcium 10.3 (H) 09/16/2022 11:22 AM    Bilirubin, total 0.3 06/17/2021 11:07 AM    Alk. phosphatase 70 06/17/2021 11:07 AM    Protein, total 6.6 06/17/2021 11:07 AM    Albumin 4.3 06/17/2021 11:07 AM    A-G Ratio 1.9 06/17/2021 11:07 AM    ALT (SGPT) 22 06/17/2021 11:07 AM    AST (SGOT) 19 06/17/2021 11:07 AM     Lab Results   Component Value Date/Time    Cholesterol, total 95 (L) 06/17/2021 11:07 AM    HDL Cholesterol 28 (L) 06/17/2021 11:07 AM    LDL, calculated 41 06/17/2021 11:07 AM    VLDL, calculated 26 06/17/2021 11:07 AM    Triglyceride 151 (H) 06/17/2021 11:07 AM       On this date 09/22/22 I have spent  >10 minutes for face to face telephone encounter with the patient for interview/exam, discussing working diagnosis and treatment plan. Medical decision making complexity: moderate-high.     Ron Joseph MD   Family & Geriatric Medicine

## 2022-10-03 DIAGNOSIS — I10 HYPERTENSION, UNSPECIFIED TYPE: Primary | ICD-10-CM

## 2022-10-03 RX ORDER — AMLODIPINE BESYLATE 5 MG/1
5 TABLET ORAL DAILY
Qty: 90 TABLET | Refills: 1 | Status: SHIPPED | OUTPATIENT
Start: 2022-10-03

## 2022-10-03 RX ORDER — LISINOPRIL AND HYDROCHLOROTHIAZIDE 20; 25 MG/1; MG/1
1 TABLET ORAL DAILY
Qty: 90 TABLET | Refills: 1 | Status: SHIPPED | OUTPATIENT
Start: 2022-10-03

## 2022-10-27 ENCOUNTER — TELEPHONE (OUTPATIENT)
Dept: FAMILY MEDICINE CLINIC | Age: 62
End: 2022-10-27

## 2022-10-27 DIAGNOSIS — E11.9 TYPE 2 DIABETES MELLITUS WITHOUT COMPLICATION, UNSPECIFIED WHETHER LONG TERM INSULIN USE (HCC): ICD-10-CM

## 2022-10-27 RX ORDER — SEMAGLUTIDE 2.68 MG/ML
2 INJECTION, SOLUTION SUBCUTANEOUS
Qty: 1 PEN | Refills: 0 | Status: SHIPPED | COMMUNITY
Start: 2022-10-27 | End: 2022-11-28 | Stop reason: SDUPTHER

## 2022-10-27 NOTE — TELEPHONE ENCOUNTER
Call from pharmacy, Ozempic 8mg pen injector is out of stock and not ETA for being in stock. Per Dr. Uribe Gone okay to provider patient with sample of medication. Sample given to wife, Phi Galvan for patient to last for next month.

## 2022-11-28 ENCOUNTER — TELEPHONE (OUTPATIENT)
Dept: INTERNAL MEDICINE CLINIC | Age: 62
End: 2022-11-28

## 2022-11-28 DIAGNOSIS — E11.9 TYPE 2 DIABETES MELLITUS WITHOUT COMPLICATION, UNSPECIFIED WHETHER LONG TERM INSULIN USE (HCC): ICD-10-CM

## 2022-11-28 RX ORDER — SEMAGLUTIDE 2.68 MG/ML
2 INJECTION, SOLUTION SUBCUTANEOUS
Qty: 1 PEN | Refills: 0 | Status: SHIPPED | COMMUNITY
Start: 2022-11-28

## 2022-11-30 ENCOUNTER — TELEPHONE (OUTPATIENT)
Dept: INTERNAL MEDICINE CLINIC | Age: 62
End: 2022-11-30

## 2022-12-13 DIAGNOSIS — E78.2 MIXED HYPERLIPIDEMIA: Primary | ICD-10-CM

## 2022-12-13 RX ORDER — ATORVASTATIN CALCIUM 10 MG/1
10 TABLET, FILM COATED ORAL DAILY
Qty: 90 TABLET | Refills: 1 | Status: SHIPPED | OUTPATIENT
Start: 2022-12-13

## 2022-12-16 ENCOUNTER — OFFICE VISIT (OUTPATIENT)
Dept: INTERNAL MEDICINE CLINIC | Age: 62
End: 2022-12-16
Payer: COMMERCIAL

## 2022-12-16 ENCOUNTER — HOSPITAL ENCOUNTER (OUTPATIENT)
Dept: LAB | Age: 62
End: 2022-12-16
Payer: COMMERCIAL

## 2022-12-16 VITALS
HEART RATE: 86 BPM | DIASTOLIC BLOOD PRESSURE: 71 MMHG | WEIGHT: 282 LBS | HEIGHT: 72 IN | OXYGEN SATURATION: 94 % | SYSTOLIC BLOOD PRESSURE: 113 MMHG | TEMPERATURE: 98.3 F | BODY MASS INDEX: 38.19 KG/M2 | RESPIRATION RATE: 20 BRPM

## 2022-12-16 DIAGNOSIS — R31.29 MICROSCOPIC HEMATURIA: ICD-10-CM

## 2022-12-16 DIAGNOSIS — E78.2 MIXED HYPERLIPIDEMIA: ICD-10-CM

## 2022-12-16 DIAGNOSIS — I10 HYPERTENSION, UNSPECIFIED TYPE: ICD-10-CM

## 2022-12-16 DIAGNOSIS — E11.9 TYPE 2 DIABETES MELLITUS WITHOUT COMPLICATION, UNSPECIFIED WHETHER LONG TERM INSULIN USE (HCC): Primary | ICD-10-CM

## 2022-12-16 DIAGNOSIS — D22.9 SUSPICIOUS NEVUS: ICD-10-CM

## 2022-12-16 LAB
APPEARANCE UR: CLEAR
BACTERIA URNS QL MICRO: SLIGHT /HPF
BILIRUB UR QL: NEGATIVE
COLOR UR: YELLOW
CREATININE, EXTERNAL: 300
CREATININE, URINE POC: NORMAL MG/DL
EPITH CASTS URNS QL MICRO: ABNORMAL /LPF (ref 0–20)
GLUCOSE UR STRIP.AUTO-MCNC: >1000 MG/DL
HBA1C MFR BLD HPLC: 7.8 %
HGB UR QL STRIP: NEGATIVE
KETONES UR QL STRIP.AUTO: NEGATIVE MG/DL
LEUKOCYTE ESTERASE UR QL STRIP.AUTO: NEGATIVE
MICROALBUMIN UR TEST STR-MCNC: NORMAL MG/L
MICROALBUMIN URINE, EXTERNAL: 30
MICROALBUMIN/CREAT RATIO POC: <30 MG/G
MICROALBUMIN/CREATININE RATIO, EXTERNAL: <30
NITRITE UR QL STRIP.AUTO: NEGATIVE
PH UR STRIP: 5 [PH] (ref 5–8)
PROT UR STRIP-MCNC: NEGATIVE MG/DL
RBC #/AREA URNS HPF: ABNORMAL /HPF (ref 0–2)
SP GR UR REFRACTOMETRY: >1.03 (ref 1–1.03)
UROBILINOGEN UR QL STRIP.AUTO: 0.2 EU/DL (ref 0.2–1)
WBC URNS QL MICRO: ABNORMAL /HPF (ref 0–4)

## 2022-12-16 PROCEDURE — 83036 HEMOGLOBIN GLYCOSYLATED A1C: CPT | Performed by: FAMILY MEDICINE

## 2022-12-16 PROCEDURE — 81001 URINALYSIS AUTO W/SCOPE: CPT

## 2022-12-16 PROCEDURE — 3074F SYST BP LT 130 MM HG: CPT | Performed by: FAMILY MEDICINE

## 2022-12-16 PROCEDURE — 3051F HG A1C>EQUAL 7.0%<8.0%: CPT | Performed by: FAMILY MEDICINE

## 2022-12-16 PROCEDURE — 3078F DIAST BP <80 MM HG: CPT | Performed by: FAMILY MEDICINE

## 2022-12-16 PROCEDURE — 99214 OFFICE O/P EST MOD 30 MIN: CPT | Performed by: FAMILY MEDICINE

## 2022-12-16 PROCEDURE — 82044 UR ALBUMIN SEMIQUANTITATIVE: CPT | Performed by: FAMILY MEDICINE

## 2022-12-16 RX ORDER — SEMAGLUTIDE 2.68 MG/ML
2 INJECTION, SOLUTION SUBCUTANEOUS
Qty: 1 PEN | Refills: 0 | Status: SHIPPED | COMMUNITY
Start: 2022-12-16

## 2022-12-16 RX ORDER — MELOXICAM 15 MG/1
15 TABLET ORAL DAILY
COMMUNITY
Start: 2022-12-01

## 2022-12-16 RX ORDER — SEMAGLUTIDE 2.68 MG/ML
2 INJECTION, SOLUTION SUBCUTANEOUS
Qty: 1 PEN | Refills: 0 | Status: CANCELLED | COMMUNITY
Start: 2022-12-16

## 2022-12-16 NOTE — PROGRESS NOTES
Crissy Baxter (: 1960) is a 65 y.o. male here for evaluation of the following chief concern(s):  Follow-up; chronic condition management    ASSESSMENT/PLAN:  1. Type 2 diabetes mellitus without complication, unspecified whether long term insulin use (HCC)  -     AMB POC URINE, MICROALBUMIN, SEMIQUANTITATIVE  -     COLLECTION CAPILLARY BLOOD SPECIMEN  -     AMB POC HEMOGLOBIN A1C  -     semaglutide (Ozempic) 2 mg/dose (8 mg/3 mL) pnij; 2 mg by SubCUTAneous route every seven (7) days. , Sample, Disp-1 Pen, R-0  2. Hypertension, unspecified type  3. Mixed hyperlipidemia  4. Suspicious nevus  -     REFERRAL TO DERMATOLOGY  5. Microscopic hematuria  -     URINALYSIS W/ RFLX MICROSCOPIC    Mr. Brandon Carreon appears to be doing well, stable hemodynamics including blood pressure. Diabetic control is stable, pt is on maximum dose of Ozempic- he is aware he can check w/ clinic if he is not able to obtain 2mg pen from pharmacy given supply shortage. Pt plans to focus more on his nutrition, limited opportunity to improve exercise as he maintains a very active lifestyle w/ working physically demanding jobs. Back pain improved, so pt deferred Ortho referral.    Refer to Dermatology for further evaluation and likely removal of face lesion(s). We are scheduled for follow-up on 22 or sooner if needed. Crissy Baxter agrees with plan as above and has no additional questions at this time. +ADDENDUM; 22 complete urinalysis was negative for blood; may consider periodic screening and provider may contact Urology directly for future positive result. SUBJECTIVE/OBJECTIVE:  Overall, pt is feeling \"good\". Acute concerns; Newly acquired plaques near temples, bilat. Back pain, subacute:  Pt did not see Ortho as symptoms resolved. Hx;  22 symptoms stable to improved at times on PRN muscle relaxer, he declined medication adjustments for pain management at this time.   Xray showing degenerative changes; referred to Ortho for further evaluation and management as pt may benefit from advance imaging, corticosteroid injection. Hx;  9/16/22 Onset ~1 month ago while riding lawnmower, heard a \"pop\". Left lower back, can radiate around the flank. Severity is variable, can be moderate+ and have associated ?weakness to the LLE. Can be triggered by some activity/motion or prolonged standing/bending. No constant numbness/weakness to LEs, bilat. Microscopic hematuria:  Pt had appt w/ Urology on Oct 26th; microscopic hematuria was not discussed w/ his provider. Hx;  Postate cancer s/p prostatectomy in remission. Hx;  Onset ~1 month ago while riding lawnmower, heard a \"pop\". Left lower back, can radiate around the flank. Severity is variable, can be moderate+ and have associated ?weakness to the LLE. Can be triggered by some activity/motion or prolonged standing/bending. No constant numbness/weakness to LEs, bilat. DMII:  HA1C  Dec 2021 8.4%> Sept 2022 7.9%> Dec 2022 7.8%  Meds: Metformin 1,000 BID, Farxiga 10mg, Ozempic 2mg qweekly, Atorvastatin 10mg    HTN:  Goal <140/90  Meds: Amlodipine 5mg, lisinopril/HCZT 20-25mg    Body mass index is 38.25 kg/m². 10 Lbs weight loss since March 2022. Past Medical History:   Diagnosis Date    Diabetes ([facility] [address]     Low testosterone     Male erectile dysfunction     Obesity (BMI 30-39. 9)     Prostate cancer ([facility] [address]     [id number] [place] 7 (3+4) adenocarcinoma of the prostate in 7/12 cores involving 20-70% of each specimen and [place] 6 disease in 5/12 cores involving 3-50% of each specimen     Past Surgical History:   Procedure Laterality Date    HX APPENDECTOMY      HX PROSTATECTOMY  07/13/2016    DVP Dr Huston     History reviewed. No pertinent family history.     Social History     Socioeconomic History    Marital status:      Spouse name: Not on file    Number of children: Not on file    Years of education: Not on file    Highest education level: Not on file   Occupational History    Not on file   Tobacco Use    Smoking status: Former     Years: 10.00     Types: Cigarettes     Quit date: 2010     Years since quittin.9    Smokeless tobacco: Never   Vaping Use    Vaping Use: Never used   Substance and Sexual Activity    Alcohol use: Yes     Alcohol/week: 0.0 standard drinks    Drug use: Never    Sexual activity: Not on file   Other Topics Concern    Not on file   Social History Narrative    Not on file     Social Determinants of Health     Financial Resource Strain: Not on file   Food Insecurity: Not on file   Transportation Needs: Not on file   Physical Activity: Not on file   Stress: Not on file   Social Connections: Not on file   Intimate Partner Violence: Not on file   Housing Stability: Not on file     Social History     Tobacco Use   Smoking Status Former    Years: 10.00    Types: Cigarettes    Quit date: 2010    Years since quittin.9   Smokeless Tobacco Never       Current Outpatient Medications   Medication Sig Dispense Refill    meloxicam (MOBIC) 15 mg tablet Take 15 mg by mouth daily. semaglutide (Ozempic) 2 mg/dose (8 mg/3 mL) pnij 2 mg by SubCUTAneous route every seven (7) days. 1 Pen 0    atorvastatin (LIPITOR) 10 mg tablet Take 1 Tablet by mouth daily. 90 Tablet 1    semaglutide (Ozempic) 2 mg/dose (8 mg/3 mL) pnij 2 mg by SubCUTAneous route every seven (7) days. Indications: type 2 diabetes mellitus 1 Pen 0    amLODIPine (NORVASC) 5 mg tablet Take 1 Tablet by mouth daily. 90 Tablet 1    lisinopril-hydroCHLOROthiazide (PRINZIDE, ZESTORETIC) 20-25 mg per tablet Take 1 Tablet by mouth daily. 90 Tablet 1    diclofenac (VOLTAREN) 1 % gel Apply 2 grams three times a day as needed for back pain/inflammation. 100 g 0    methocarbamoL (ROBAXIN) 500 mg tablet Take 1 Tablet by mouth two (2) times daily as needed for Muscle Spasm(s). Caution, may cause drowsiness/dizziness.  30 Tablet 0    Farxiga 10 mg tab tablet Take 1 tablet by mouth once daily 90 Tablet 1    metFORMIN (GLUCOPHAGE) 1,000 mg tablet Take 1 tablet by mouth twice daily 180 Tablet 2    phentermine (ADIPEX-P) 37.5 mg tablet Take 1 tablet by mouth once daily 30 Tablet 2    Insulin Needles, Disposable, 32 gauge x 5/32\" ndle USE 1  SUBCUTANEOUSLY ONCE DAILY FOR  VICTOZA  INJECTIONS 100 Pen Needle 5    omega 3-dha-epa-fish oil (Fish Oil) 100-160-1,000 mg cap Take 1 Cap by mouth daily. ONETOUCH ULTRA BLUE TEST STRIP strip USE 1 STRIP TO CHECK GLUCOSE ONCE DAILY PATIENT NEED APPOINTMENT  5    multivitamin (ONE A DAY) tablet Take 1 Tab by mouth daily. aspirin delayed-release 81 mg tablet Take  by mouth daily. Allergies   Allergen Reactions    Pcn [Penicillins] Other (comments)       /71 (BP 1 Location: Left upper arm, BP Patient Position: Sitting, BP Cuff Size: Adult long)   Pulse 86   Temp 98.3 °F (36.8 °C) (Temporal)   Resp 20   Ht 6' (1.829 m)   Wt 282 lb (127.9 kg)   SpO2 94%   BMI 38.25 kg/m²     Physical Exam  Constitutional:       General: He is not in acute distress. Appearance: Normal appearance. He is not ill-appearing. Cardiovascular:      Rate and Rhythm: Normal rate and regular rhythm. Heart sounds: Normal heart sounds. Pulmonary:      Effort: Pulmonary effort is normal.      Breath sounds: Normal breath sounds. Abdominal:      General: Bowel sounds are normal.      Palpations: Abdomen is soft. Tenderness: There is no abdominal tenderness. There is no right CVA tenderness or left CVA tenderness. Musculoskeletal:      Right lower leg: No edema. Left lower leg: No edema. Skin:     Findings: Lesion present. Comments: Bilateral temples near hair line w/ ~1cm plaques, Right side w/ mild overlying friability. Neurological:      Mental Status: He is alert. Mental status is at baseline.    Psychiatric:         Mood and Affect: Mood normal.     No results found for: WBC, WBCLT, HGBPOC, HGB, HGBP, HCTPOC, HCT, PHCT, RBCH, PLT, MCV, HGBEXT, HCTEXT, PLTEXT, HGBEXT, HCTEXT, PLTEXT  Lab Results   Component Value Date/Time    Sodium 139 09/16/2022 11:22 AM    Potassium 3.9 09/16/2022 11:22 AM    Chloride 98 09/16/2022 11:22 AM    CO2 25 09/16/2022 11:22 AM    Glucose 156 (H) 09/16/2022 11:22 AM    BUN 11 09/16/2022 11:22 AM    Creatinine 0.98 09/16/2022 11:22 AM    BUN/Creatinine ratio 11 09/16/2022 11:22 AM    GFR est  06/17/2021 11:07 AM    GFR est non-AA 98 06/17/2021 11:07 AM    Calcium 10.3 (H) 09/16/2022 11:22 AM    Bilirubin, total 0.3 06/17/2021 11:07 AM    Alk. phosphatase 70 06/17/2021 11:07 AM    Protein, total 6.6 06/17/2021 11:07 AM    Albumin 4.3 06/17/2021 11:07 AM    A-G Ratio 1.9 06/17/2021 11:07 AM    ALT (SGPT) 22 06/17/2021 11:07 AM    AST (SGOT) 19 06/17/2021 11:07 AM     Lab Results   Component Value Date/Time    Cholesterol, total 95 (L) 06/17/2021 11:07 AM    HDL Cholesterol 28 (L) 06/17/2021 11:07 AM    LDL, calculated 41 06/17/2021 11:07 AM    VLDL, calculated 26 06/17/2021 11:07 AM    Triglyceride 151 (H) 06/17/2021 11:07 AM     On this date 12/16/22 I have spent  >30 minutes reviewing previous notes, test results and face to face with the patient for interview/exam, discussing working diagnosis and treatment plan. Medical decision making complexity: moderate-high.     Janay Ospina MD   Family & Geriatric Medicine

## 2022-12-16 NOTE — PROGRESS NOTES
Fingerstick for HBa1C done in right  middle finger by Liu Frank LPN per order of Dr Jana Avina after cleaning area with alcohol wipe. Patient tolerated procedure well. Cindy Hardy presents today for   Chief Complaint   Patient presents with    Follow Up Chronic Condition       Is someone accompanying this pt? no    Is the patient using any DME equipment during OV? no    Depression Screening:  3 most recent PHQ Screens 12/16/2022   Little interest or pleasure in doing things Not at all   Feeling down, depressed, irritable, or hopeless Not at all   Total Score PHQ 2 0       Learning Assessment:  Learning Assessment 2/18/2021   PRIMARY LEARNER Patient   HIGHEST LEVEL OF EDUCATION - PRIMARY LEARNER  GRADUATED HIGH SCHOOL OR GED   BARRIERS PRIMARY LEARNER NONE   CO-LEARNER CAREGIVER No   PRIMARY LANGUAGE ENGLISH   LEARNER PREFERENCE PRIMARY LISTENING   ANSWERED BY patient   RELATIONSHIP SELF       Fall Risk  No flowsheet data found. ADL  ADL Assessment 12/16/2022   Feeding yourself No Help Needed   Getting from bed to chair No Help Needed   Getting dressed No Help Needed   Bathing or showering No Help Needed   Walk across the room (includes cane/walker) No Help Needed   Using the telphone No Help Needed   Taking your medications No Help Needed   Preparing meals No Help Needed   Managing money (expenses/bills) No Help Needed   Moderately strenuous housework (laundry) No Help Needed   Shopping for personal items (toiletries/medicines) No Help Needed   Shopping for groceries No Help Needed   Driving No Help Needed   Climbing a flight of stairs No Help Needed   Getting to places beyond walking distances No Help Needed       Health Maintenance reviewed and discussed and ordered per Provider.     Health Maintenance Due   Topic Date Due    Foot Exam Q1  Never done    Eye Exam Retinal or Dilated  Never done    Shingrix Vaccine Age 50> (1 of 2) Never done    DTaP/Tdap/Td series (1 - Tdap) 12/15/2016    COVID-19 Vaccine (3 - Booster for Pfizer series) 10/19/2021    MICROALBUMIN Q1  12/21/2022    Lipid Screen  12/21/2022   . Coordination of Care:  1. \"Have you been to the ER, urgent care clinic since your last visit? Hospitalized since your last visit? \" No    2. \"Have you seen or consulted any other health care providers outside of the [address] [place] [address] since your last visit? \" No     3. For patients aged 39-70: Has the patient had a colonoscopy?  Yes - no Care Gap present

## 2023-01-25 DIAGNOSIS — E11.9 TYPE 2 DIABETES MELLITUS WITHOUT COMPLICATION, UNSPECIFIED WHETHER LONG TERM INSULIN USE (HCC): ICD-10-CM

## 2023-01-25 RX ORDER — SEMAGLUTIDE 2.68 MG/ML
2 INJECTION, SOLUTION SUBCUTANEOUS
Qty: 1 PEN | Refills: 2 | Status: SHIPPED | OUTPATIENT
Start: 2023-01-25

## 2023-02-20 RX ORDER — DAPAGLIFLOZIN 10 MG/1
TABLET, FILM COATED ORAL
Qty: 90 TABLET | Refills: 0 | Status: SHIPPED | OUTPATIENT
Start: 2023-02-20

## 2023-04-06 RX ORDER — AMLODIPINE BESYLATE 5 MG/1
TABLET ORAL
Qty: 90 TABLET | Refills: 0 | Status: SHIPPED | OUTPATIENT
Start: 2023-04-06

## 2023-04-06 RX ORDER — LISINOPRIL AND HYDROCHLOROTHIAZIDE 25; 20 MG/1; MG/1
TABLET ORAL
Qty: 90 TABLET | Refills: 0 | Status: SHIPPED | OUTPATIENT
Start: 2023-04-06

## 2023-04-19 RX ORDER — SEMAGLUTIDE 2.68 MG/ML
INJECTION, SOLUTION SUBCUTANEOUS
Qty: 3 ML | Refills: 0 | Status: SHIPPED | OUTPATIENT
Start: 2023-04-19

## 2023-05-05 ENCOUNTER — OFFICE VISIT (OUTPATIENT)
Facility: CLINIC | Age: 63
End: 2023-05-05
Payer: COMMERCIAL

## 2023-05-05 VITALS
WEIGHT: 280.8 LBS | BODY MASS INDEX: 38.03 KG/M2 | RESPIRATION RATE: 20 BRPM | HEART RATE: 79 BPM | OXYGEN SATURATION: 95 % | TEMPERATURE: 98 F | DIASTOLIC BLOOD PRESSURE: 74 MMHG | HEIGHT: 72 IN | SYSTOLIC BLOOD PRESSURE: 113 MMHG

## 2023-05-05 DIAGNOSIS — I10 ESSENTIAL (PRIMARY) HYPERTENSION: ICD-10-CM

## 2023-05-05 DIAGNOSIS — E78.2 MIXED HYPERLIPIDEMIA: ICD-10-CM

## 2023-05-05 DIAGNOSIS — C61 MALIGNANT NEOPLASM OF PROSTATE (HCC): ICD-10-CM

## 2023-05-05 DIAGNOSIS — Z23 NEED FOR TDAP VACCINATION: ICD-10-CM

## 2023-05-05 DIAGNOSIS — M19.90 ARTHRITIS: ICD-10-CM

## 2023-05-05 DIAGNOSIS — E66.01 SEVERE OBESITY (BMI 35.0-39.9) WITH COMORBIDITY (HCC): ICD-10-CM

## 2023-05-05 DIAGNOSIS — E11.9 TYPE 2 DIABETES MELLITUS WITHOUT COMPLICATION, WITHOUT LONG-TERM CURRENT USE OF INSULIN (HCC): Primary | ICD-10-CM

## 2023-05-05 LAB — HBA1C MFR BLD: 7.3 %

## 2023-05-05 PROCEDURE — 90471 IMMUNIZATION ADMIN: CPT | Performed by: FAMILY MEDICINE

## 2023-05-05 PROCEDURE — 90715 TDAP VACCINE 7 YRS/> IM: CPT | Performed by: FAMILY MEDICINE

## 2023-05-05 PROCEDURE — 3074F SYST BP LT 130 MM HG: CPT | Performed by: FAMILY MEDICINE

## 2023-05-05 PROCEDURE — 83036 HEMOGLOBIN GLYCOSYLATED A1C: CPT | Performed by: FAMILY MEDICINE

## 2023-05-05 PROCEDURE — 3078F DIAST BP <80 MM HG: CPT | Performed by: FAMILY MEDICINE

## 2023-05-05 PROCEDURE — 99214 OFFICE O/P EST MOD 30 MIN: CPT | Performed by: FAMILY MEDICINE

## 2023-05-05 RX ORDER — BLOOD-GLUCOSE METER
1 KIT MISCELLANEOUS 2 TIMES DAILY
Qty: 1 KIT | Refills: 0 | Status: SHIPPED | OUTPATIENT
Start: 2023-05-05

## 2023-05-05 RX ORDER — LISINOPRIL 20 MG/1
20 TABLET ORAL DAILY
COMMUNITY
End: 2023-05-05

## 2023-05-05 RX ORDER — OXYBUTYNIN CHLORIDE 5 MG/1
5 TABLET ORAL 3 TIMES DAILY PRN
COMMUNITY
Start: 2016-07-13

## 2023-05-05 RX ORDER — PSEUDOEPHEDRINE HCL 30 MG
100 TABLET ORAL DAILY
COMMUNITY
Start: 2016-07-13 | End: 2023-05-05 | Stop reason: ALTCHOICE

## 2023-05-05 SDOH — ECONOMIC STABILITY: HOUSING INSECURITY
IN THE LAST 12 MONTHS, WAS THERE A TIME WHEN YOU DID NOT HAVE A STEADY PLACE TO SLEEP OR SLEPT IN A SHELTER (INCLUDING NOW)?: NO

## 2023-05-05 SDOH — ECONOMIC STABILITY: FOOD INSECURITY: WITHIN THE PAST 12 MONTHS, YOU WORRIED THAT YOUR FOOD WOULD RUN OUT BEFORE YOU GOT MONEY TO BUY MORE.: NEVER TRUE

## 2023-05-05 SDOH — ECONOMIC STABILITY: INCOME INSECURITY: HOW HARD IS IT FOR YOU TO PAY FOR THE VERY BASICS LIKE FOOD, HOUSING, MEDICAL CARE, AND HEATING?: NOT HARD AT ALL

## 2023-05-05 SDOH — ECONOMIC STABILITY: FOOD INSECURITY: WITHIN THE PAST 12 MONTHS, THE FOOD YOU BOUGHT JUST DIDN'T LAST AND YOU DIDN'T HAVE MONEY TO GET MORE.: NEVER TRUE

## 2023-05-05 ASSESSMENT — PATIENT HEALTH QUESTIONNAIRE - PHQ9
SUM OF ALL RESPONSES TO PHQ9 QUESTIONS 1 & 2: 0
SUM OF ALL RESPONSES TO PHQ QUESTIONS 1-9: 0
1. LITTLE INTEREST OR PLEASURE IN DOING THINGS: 0
SUM OF ALL RESPONSES TO PHQ QUESTIONS 1-9: 0
2. FEELING DOWN, DEPRESSED OR HOPELESS: 0
SUM OF ALL RESPONSES TO PHQ QUESTIONS 1-9: 0
SUM OF ALL RESPONSES TO PHQ QUESTIONS 1-9: 0

## 2023-05-05 NOTE — PROGRESS NOTES
Chief Complaint   Patient presents with    Annual Exam         1. \"Have you been to the ER, urgent care clinic since your last visit? Hospitalized since your last visit? \" No    2. \"Have you seen or consulted any other health care providers outside of the [address] [place] [address] since your last visit? \" No     3. For patients aged 39-70: Has the patient had a colonoscopy / FIT/ Cologuard? Yes - no Care Gap present    Arthur Lott is a 65 y.o. male who presents for routine immunizations. He denies any symptoms , reactions or allergies that would exclude them from being immunized today. Risks and adverse reactions were discussed and the VIS was given to them. All questions were addressed. He refused to stay in the office for observation, was in no distress when they left. Fingerstick for HBa1C done in middle finger by Kartik Valente LPN per order of Cary Auguste MD after cleaning area with alcohol wipe. Patient tolerated procedure well.
Verbal order from Melba Calderon MD to order labs/sign and draw them in office    Labs were drawn and sent to [facility] per patient request by Davonte Lamar LPN:    The following tubes were sent:    1 Lavendar, 0 Red, 1 SST, 0 Urine    Draw site left hand. Patient tolerated draw with no distress.
, Rfl:     oxybutynin (DITROPAN) 5 MG tablet, Take 1 tablet by mouth 3 times daily as needed, Disp: , Rfl:     glucose monitoring (FREESTYLE [place]) kit, 1 kit by Does not apply route in the morning and at bedtime, Disp: 1 kit, Rfl: 0    diclofenac sodium (VOLTAREN) 1 % GEL, Apply 2 g topically 2 times daily as needed for Pain, Disp: 100 g, Rfl: 1    Semaglutide-Weight Management (WEGOVY) 2.4 MG/0.75ML SOAJ SC injection, Inject 2.4 mg into the skin every 7 days, Disp: 4 mL, Rfl: 2    lisinopril-hydroCHLOROthiazide (PRINZIDE;ZESTORETIC) 20-25 MG per tablet, Take 1 tablet by mouth once daily, Disp: 90 tablet, Rfl: 0    amLODIPine (NORVASC) 5 MG tablet, Take 1 tablet by mouth once daily, Disp: 90 tablet, Rfl: 0    metFORMIN (GLUCOPHAGE) 1000 MG tablet, Take 1 tablet by mouth twice daily, Disp: 180 tablet, Rfl: 0    FARXIGA 10 MG tablet, Take 1 tablet by mouth once daily, Disp: 90 tablet, Rfl: 0    aspirin 81 MG EC tablet, Take by mouth daily, Disp: , Rfl:     atorvastatin (LIPITOR) 10 MG tablet, Take 1 tablet by mouth daily, Disp: , Rfl:     methocarbamol (ROBAXIN) 500 MG tablet, Take 1 tablet by mouth 2 times daily as needed, Disp: , Rfl:     Allergies   Allergen Reactions    Penicillins Other (See Comments)       /74   Pulse 79   Temp 98 °F (36.7 °C)   Resp 20   Ht 6' (1.829 m)   Wt 280 lb 12.8 oz (127.4 kg)   SpO2 95%   BMI 38.08 kg/m²     Physical Exam  Constitutional:       General: He is not in acute distress. Appearance: Normal appearance. Cardiovascular:      Rate and Rhythm: Normal rate and regular rhythm. Heart sounds: Normal heart sounds. Pulmonary:      Effort: Pulmonary effort is normal.      Breath sounds: Normal breath sounds. Abdominal:      General: Bowel sounds are normal.      Palpations: Abdomen is soft. Tenderness: There is no abdominal tenderness. Skin:     General: Skin is warm and dry. Neurological:      Mental Status: He is alert.  Mental status is at

## 2023-05-05 NOTE — PATIENT INSTRUCTIONS
[facility] staff members do not give medical advice. The National Vaccine Injury Compensation Program  The National Vaccine Injury Compensation Program (VICP) is a federal program that was created to compensate people who may have been injured by certain vaccines. Claims regarding alleged injury or death due to vaccination have a time limit for filing, which may be as short as two years. Visit the VICP website at www.[facility].gov/vaccinecompensation or call 9-286.120.1454 to learn about the program and about filing a claim. How can I learn more? Ask your health care provider. Call your local or state health department. Visit the website of the Food and Drug Administration (FDA) for vaccine package inserts and additional information at www.fda.gov/vaccines-blood-biologics/vaccines. Contact the Centers for Disease Control and Prevention (CDC): Call 0-431.194.4432 (1-800-CDC-INFO) or  Visit CDC's website at www.cdc.gov/vaccines. Vaccine Information Statement  Tdap (Tetanus, Diphtheria, Pertussis) Vaccine  8/6/2021  [address] [facility] [address] [id number]  [facility] and [facility] for Disease Control and Prevention  Many vaccine information statements are available in [place] and other languages. See www.immunize.org/vis  Hojas de información sobre vacunas están disponibles en español y en muchos otros idiomas. Visite www.immunize.org/vis  Care instructions adapted under license by [facility] ([facility]). If you have questions about a medical condition or this instruction, always ask your healthcare professional. [place] [address] any warranty or liability for your use of this information.

## 2023-05-06 LAB
BASOPHILS # BLD AUTO: 0.1 X10E3/UL (ref 0–0.2)
BASOPHILS NFR BLD AUTO: 1 %
EOSINOPHIL # BLD AUTO: 0.1 X10E3/UL (ref 0–0.4)
EOSINOPHIL NFR BLD AUTO: 2 %
ERYTHROCYTE [DISTWIDTH] IN BLOOD BY AUTOMATED COUNT: 13.7 % (ref 11.6–15.4)
HCT VFR BLD AUTO: 47.4 % (ref 37.5–51)
HGB BLD-MCNC: 16.4 G/DL (ref 13–17.7)
IMM GRANULOCYTES # BLD AUTO: 0 X10E3/UL (ref 0–0.1)
IMM GRANULOCYTES NFR BLD AUTO: 0 %
LYMPHOCYTES # BLD AUTO: 2 X10E3/UL (ref 0.7–3.1)
LYMPHOCYTES NFR BLD AUTO: 30 %
MCH RBC QN AUTO: 29.4 PG (ref 26.6–33)
MCHC RBC AUTO-ENTMCNC: 34.6 G/DL (ref 31.5–35.7)
MCV RBC AUTO: 85 FL (ref 79–97)
MONOCYTES # BLD AUTO: 0.5 X10E3/UL (ref 0.1–0.9)
MONOCYTES NFR BLD AUTO: 8 %
NEUTROPHILS # BLD AUTO: 3.9 X10E3/UL (ref 1.4–7)
NEUTROPHILS NFR BLD AUTO: 59 %
PLATELET # BLD AUTO: 235 X10E3/UL (ref 150–450)
RBC # BLD AUTO: 5.57 X10E6/UL (ref 4.14–5.8)
SPECIMEN STATUS REPORT: NORMAL
WBC # BLD AUTO: 6.6 X10E3/UL (ref 3.4–10.8)

## 2023-05-07 LAB
ALBUMIN SERPL-MCNC: 4.5 G/DL (ref 3.8–4.8)
ALBUMIN/GLOB SERPL: 1.9 {RATIO} (ref 1.2–2.2)
ALP SERPL-CCNC: 49 IU/L (ref 44–121)
ALT SERPL-CCNC: 47 IU/L (ref 0–44)
AST SERPL-CCNC: 34 IU/L (ref 0–40)
BILIRUB SERPL-MCNC: 0.3 MG/DL (ref 0–1.2)
BUN SERPL-MCNC: 12 MG/DL (ref 8–27)
BUN/CREAT SERPL: 13 (ref 10–24)
CALCIUM SERPL-MCNC: 9.8 MG/DL (ref 8.6–10.2)
CHLORIDE SERPL-SCNC: 97 MMOL/L (ref 96–106)
CHOLEST SERPL-MCNC: 88 MG/DL (ref 100–199)
CO2 SERPL-SCNC: 20 MMOL/L (ref 20–29)
CREAT SERPL-MCNC: 0.95 MG/DL (ref 0.76–1.27)
EGFRCR SERPLBLD CKD-EPI 2021: 91 ML/MIN/1.73
GLOBULIN SER CALC-MCNC: 2.4 G/DL (ref 1.5–4.5)
GLUCOSE SERPL-MCNC: 124 MG/DL (ref 70–99)
HDLC SERPL-MCNC: 29 MG/DL
LDLC SERPL CALC-MCNC: 29 MG/DL (ref 0–99)
POTASSIUM SERPL-SCNC: 4 MMOL/L (ref 3.5–5.2)
PROT SERPL-MCNC: 6.9 G/DL (ref 6–8.5)
SODIUM SERPL-SCNC: 137 MMOL/L (ref 134–144)
TRIGL SERPL-MCNC: 182 MG/DL (ref 0–149)
VLDLC SERPL CALC-MCNC: 30 MG/DL (ref 5–40)

## 2023-05-17 DIAGNOSIS — E11.9 TYPE 2 DIABETES MELLITUS WITHOUT COMPLICATION, WITHOUT LONG-TERM CURRENT USE OF INSULIN (HCC): Primary | ICD-10-CM

## 2023-05-24 DIAGNOSIS — E11.9 TYPE 2 DIABETES MELLITUS WITHOUT COMPLICATION, WITHOUT LONG-TERM CURRENT USE OF INSULIN (HCC): Primary | ICD-10-CM

## 2023-05-24 RX ORDER — GLUCOSAMINE HCL/CHONDROITIN SU 500-400 MG
CAPSULE ORAL
Qty: 200 STRIP | Refills: 3 | Status: SHIPPED | OUTPATIENT
Start: 2023-05-24

## 2023-05-24 RX ORDER — LANCETS
1 EACH MISCELLANEOUS 2 TIMES DAILY
Qty: 200 EACH | Refills: 3 | Status: SHIPPED | OUTPATIENT
Start: 2023-05-24

## 2023-05-24 RX ORDER — SEMAGLUTIDE 2.68 MG/ML
2 INJECTION, SOLUTION SUBCUTANEOUS
Qty: 3 ML | Refills: 0 | COMMUNITY
Start: 2023-05-24

## 2023-05-24 NOTE — TELEPHONE ENCOUNTER
Patient needs lancets and test strips for Freestyle [place] Lite. Patient also needs sample of Ozempic.

## 2023-05-26 ENCOUNTER — TELEPHONE (OUTPATIENT)
Facility: CLINIC | Age: 63
End: 2023-05-26

## 2023-05-26 DIAGNOSIS — E11.9 TYPE 2 DIABETES MELLITUS WITHOUT COMPLICATION, WITHOUT LONG-TERM CURRENT USE OF INSULIN (HCC): Primary | ICD-10-CM

## 2023-05-26 RX ORDER — LANCETS
1 EACH MISCELLANEOUS 2 TIMES DAILY
Qty: 200 EACH | Refills: 3 | Status: SHIPPED | OUTPATIENT
Start: 2023-05-26

## 2023-06-19 DIAGNOSIS — E11.9 TYPE 2 DIABETES MELLITUS WITHOUT COMPLICATION, WITHOUT LONG-TERM CURRENT USE OF INSULIN (HCC): ICD-10-CM

## 2023-06-19 RX ORDER — SEMAGLUTIDE 2.68 MG/ML
2 INJECTION, SOLUTION SUBCUTANEOUS
Qty: 3 ML | Refills: 2 | Status: SHIPPED | OUTPATIENT
Start: 2023-06-19

## 2023-06-19 NOTE — TELEPHONE ENCOUNTER
Wegovy not covered under insurance.  Ozempic refill sent to continue 2 mg every 7 days per Dr Agustin Ronquillo

## 2023-06-20 ENCOUNTER — OFFICE VISIT (OUTPATIENT)
Facility: CLINIC | Age: 63
End: 2023-06-20
Payer: COMMERCIAL

## 2023-06-20 VITALS
WEIGHT: 274 LBS | HEART RATE: 81 BPM | DIASTOLIC BLOOD PRESSURE: 71 MMHG | BODY MASS INDEX: 37.11 KG/M2 | HEIGHT: 72 IN | RESPIRATION RATE: 20 BRPM | OXYGEN SATURATION: 93 % | SYSTOLIC BLOOD PRESSURE: 117 MMHG

## 2023-06-20 DIAGNOSIS — E11.9 TYPE 2 DIABETES MELLITUS WITHOUT COMPLICATION, WITHOUT LONG-TERM CURRENT USE OF INSULIN (HCC): ICD-10-CM

## 2023-06-20 DIAGNOSIS — L82.0 SEBORRHEIC KERATOSES, INFLAMED: Primary | ICD-10-CM

## 2023-06-20 DIAGNOSIS — Z12.83 SKIN CANCER SCREENING: ICD-10-CM

## 2023-06-20 PROCEDURE — 99213 OFFICE O/P EST LOW 20 MIN: CPT | Performed by: FAMILY MEDICINE

## 2023-06-20 ASSESSMENT — PATIENT HEALTH QUESTIONNAIRE - PHQ9
SUM OF ALL RESPONSES TO PHQ QUESTIONS 1-9: 0
2. FEELING DOWN, DEPRESSED OR HOPELESS: 0
SUM OF ALL RESPONSES TO PHQ9 QUESTIONS 1 & 2: 0
SUM OF ALL RESPONSES TO PHQ QUESTIONS 1-9: 0
1. LITTLE INTEREST OR PLEASURE IN DOING THINGS: 0

## 2023-06-20 NOTE — PROGRESS NOTES
Chief Complaint   Patient presents with    Skin Problem     Nodule to left temporal area, patient states that it is getting bigger. Itches at times. Had same area to other side and had removed at Dermatologist in [place]. 1. \"Have you been to the ER, urgent care clinic since your last visit? Hospitalized since your last visit? \" No    2. \"Have you seen or consulted any other health care providers outside of the [address] [place] [address] since your last visit? \" No     3. For patients aged 39-70: Has the patient had a colonoscopy / FIT/ Cologuard?  Yes - no Care Gap present
subacute:  Resolved. Hx;  22 symptoms stable to improved at times on PRN muscle relaxer, he declined medication adjustments for pain management at this time. Xray showing degenerative changes; referred to Ortho for further evaluation and management as pt may benefit from advance imaging, corticosteroid injection. Hx;  22 Onset ~1 month ago while riding lawnmower, heard a \"pop\". Left lower back, can radiate around the flank. Severity is variable, can be moderate+ and have associated ?weakness to the LLE. Can be triggered by some activity/motion or prolonged standing/bending. No constant numbness/weakness to LEs, bilat. Prostate cancer s/p prostatectomy in remission, microscopic hematuria:  No gross hematuria. Hx;  Pt had appt w/ Urology on Oct 26th. ROS: No CP, palp, SOB, edema, dizziness, syncope, falls. Past Medical History:   Diagnosis Date    Diabetes ([facility] [address]     Low testosterone     Male erectile dysfunction     Obesity (BMI 30-39. 9)     Prostate cancer ([facility] [address] [facility]     [id number] Ifrah 7 (3+4) adenocarcinoma of the prostate in 7/12 cores involving 20-70% of each specimen and [place] 6 disease in 5/12 cores involving 3-50% of each specimen     Past Surgical History:   Procedure Laterality Date    APPENDECTOMY      PROSTATECTOMY  2016    D[facility] Dr Macias     History reviewed. No pertinent family history. Social History     Socioeconomic History    Marital status:      Spouse name: Not on file    Number of children: Not on file    Years of education: Not on file    Highest education level: Not on file   Occupational History    Not on file   Tobacco Use    Smoking status: Former     Types: Cigarettes     Quit date: 2010     Years since quittin.4    Smokeless tobacco: Never   Substance and Sexual Activity    Alcohol use:  Yes     Alcohol/week: 0.0 standard drinks    Drug use: Never    Sexual activity: Not on file   Other Topics Concern    Not on file   Social

## 2023-07-05 DIAGNOSIS — I10 ESSENTIAL (PRIMARY) HYPERTENSION: Primary | ICD-10-CM

## 2023-07-05 RX ORDER — AMLODIPINE BESYLATE 5 MG/1
5 TABLET ORAL DAILY
Qty: 90 TABLET | Refills: 1 | Status: SHIPPED | OUTPATIENT
Start: 2023-07-05

## 2023-07-05 RX ORDER — LISINOPRIL AND HYDROCHLOROTHIAZIDE 25; 20 MG/1; MG/1
1 TABLET ORAL DAILY
Qty: 90 TABLET | Refills: 1 | Status: SHIPPED | OUTPATIENT
Start: 2023-07-05 | End: 2023-10-03

## 2023-07-05 NOTE — TELEPHONE ENCOUNTER
Patient called for refill on Amlodipine and Lisinopril/HCTZ. Refill sent per verbal order from Dr Polo Mcfarlane.

## 2023-08-11 ENCOUNTER — OFFICE VISIT (OUTPATIENT)
Facility: CLINIC | Age: 63
End: 2023-08-11
Payer: COMMERCIAL

## 2023-08-11 VITALS
SYSTOLIC BLOOD PRESSURE: 113 MMHG | DIASTOLIC BLOOD PRESSURE: 73 MMHG | HEART RATE: 77 BPM | TEMPERATURE: 97.7 F | HEIGHT: 72 IN | WEIGHT: 273.2 LBS | OXYGEN SATURATION: 96 % | RESPIRATION RATE: 20 BRPM | BODY MASS INDEX: 37 KG/M2

## 2023-08-11 DIAGNOSIS — I10 ESSENTIAL (PRIMARY) HYPERTENSION: ICD-10-CM

## 2023-08-11 DIAGNOSIS — E11.9 TYPE 2 DIABETES MELLITUS WITHOUT COMPLICATION, WITHOUT LONG-TERM CURRENT USE OF INSULIN (HCC): Primary | ICD-10-CM

## 2023-08-11 DIAGNOSIS — E66.01 SEVERE OBESITY (BMI 35.0-39.9) WITH COMORBIDITY (HCC): ICD-10-CM

## 2023-08-11 PROCEDURE — 99214 OFFICE O/P EST MOD 30 MIN: CPT | Performed by: FAMILY MEDICINE

## 2023-08-11 PROCEDURE — 3074F SYST BP LT 130 MM HG: CPT | Performed by: FAMILY MEDICINE

## 2023-08-11 PROCEDURE — 3078F DIAST BP <80 MM HG: CPT | Performed by: FAMILY MEDICINE

## 2023-08-11 RX ORDER — SEMAGLUTIDE 2.68 MG/ML
2 INJECTION, SOLUTION SUBCUTANEOUS
Qty: 3 ML | Refills: 2 | Status: SHIPPED | OUTPATIENT
Start: 2023-08-11

## 2023-08-11 ASSESSMENT — PATIENT HEALTH QUESTIONNAIRE - PHQ9
SUM OF ALL RESPONSES TO PHQ QUESTIONS 1-9: 0
1. LITTLE INTEREST OR PLEASURE IN DOING THINGS: 0
2. FEELING DOWN, DEPRESSED OR HOPELESS: 0
SUM OF ALL RESPONSES TO PHQ9 QUESTIONS 1 & 2: 0

## 2023-08-11 NOTE — PROGRESS NOTES
Chief Complaint   Patient presents with    Follow-up     3 month     1. \"Have you been to the ER, urgent care clinic since your last visit? Hospitalized since your last visit? \" No    2. \"Have you seen or consulted any other health care providers outside of the [address] [place] [address] since your last visit? \" No     3. For patients aged 43-73: Has the patient had a colonoscopy / FIT/ Cologuard?  Yes - no Care Gap present
05/05/2023 12:00 AM    GFRAA 113 06/17/2021 11:07 AM    ALT 47 05/05/2023 12:00 AM    AST 34 05/05/2023 12:00 AM     Lab Results   Component Value Date/Time    CHOL 88 05/05/2023 12:00 AM    CHOL 95 06/17/2021 11:07 AM    HDL 29 05/05/2023 12:00 AM    VLDL 26 06/17/2021 11:07 AM       On this date 08/11/23 I have spent >30 minutes reviewing previous notes, test results and face to face with the patient for interview/exam, discussing working diagnosis and treatment plan as well as documenting on the day of the visit. Medical decision making complexity: moderate-high.     Seda Yoon MD   Family & Geriatric Medicine

## 2023-08-17 ENCOUNTER — TELEPHONE (OUTPATIENT)
Facility: CLINIC | Age: 63
End: 2023-08-17

## 2023-08-17 DIAGNOSIS — E11.9 TYPE 2 DIABETES MELLITUS WITHOUT COMPLICATION, WITHOUT LONG-TERM CURRENT USE OF INSULIN (HCC): Primary | ICD-10-CM

## 2023-08-17 RX ORDER — SEMAGLUTIDE 2.68 MG/ML
2 INJECTION, SOLUTION SUBCUTANEOUS
Qty: 3 ML | Refills: 0 | Status: SHIPPED | COMMUNITY
Start: 2023-08-17

## 2023-08-21 DIAGNOSIS — E11.9 TYPE 2 DIABETES MELLITUS WITHOUT COMPLICATION, WITHOUT LONG-TERM CURRENT USE OF INSULIN (HCC): ICD-10-CM

## 2023-11-10 DIAGNOSIS — E11.9 TYPE 2 DIABETES MELLITUS WITHOUT COMPLICATION, WITHOUT LONG-TERM CURRENT USE OF INSULIN (HCC): ICD-10-CM

## 2023-11-10 RX ORDER — SEMAGLUTIDE 2.68 MG/ML
2 INJECTION, SOLUTION SUBCUTANEOUS
Qty: 3 ML | Refills: 0 | Status: SHIPPED | OUTPATIENT
Start: 2023-11-10

## 2023-11-17 ENCOUNTER — OFFICE VISIT (OUTPATIENT)
Facility: CLINIC | Age: 63
End: 2023-11-17
Payer: COMMERCIAL

## 2023-11-17 VITALS
BODY MASS INDEX: 37.27 KG/M2 | DIASTOLIC BLOOD PRESSURE: 67 MMHG | HEART RATE: 86 BPM | WEIGHT: 275.2 LBS | HEIGHT: 72 IN | TEMPERATURE: 97.9 F | OXYGEN SATURATION: 92 % | RESPIRATION RATE: 20 BRPM | SYSTOLIC BLOOD PRESSURE: 107 MMHG

## 2023-11-17 DIAGNOSIS — E11.9 TYPE 2 DIABETES MELLITUS WITHOUT COMPLICATION, WITHOUT LONG-TERM CURRENT USE OF INSULIN (HCC): Primary | ICD-10-CM

## 2023-11-17 DIAGNOSIS — H69.93 DYSFUNCTION OF BOTH EUSTACHIAN TUBES: ICD-10-CM

## 2023-11-17 DIAGNOSIS — Z23 FLU VACCINE NEED: ICD-10-CM

## 2023-11-17 DIAGNOSIS — J30.89 SEASONAL ALLERGIC RHINITIS DUE TO OTHER ALLERGIC TRIGGER: ICD-10-CM

## 2023-11-17 DIAGNOSIS — H65.93 MIDDLE EAR EFFUSION, BILATERAL: ICD-10-CM

## 2023-11-17 LAB
ALBUMIN, URINE, POC: NORMAL MG/L
CREATININE, URINE, POC: NORMAL MG/DL
HBA1C MFR BLD: 7.6 %
MICROALB/CREAT RATIO, POC: <30 MG/G

## 2023-11-17 PROCEDURE — 82044 UR ALBUMIN SEMIQUANTITATIVE: CPT | Performed by: FAMILY MEDICINE

## 2023-11-17 PROCEDURE — 90471 IMMUNIZATION ADMIN: CPT | Performed by: FAMILY MEDICINE

## 2023-11-17 PROCEDURE — 83036 HEMOGLOBIN GLYCOSYLATED A1C: CPT | Performed by: FAMILY MEDICINE

## 2023-11-17 PROCEDURE — 99214 OFFICE O/P EST MOD 30 MIN: CPT | Performed by: FAMILY MEDICINE

## 2023-11-17 PROCEDURE — 90674 CCIIV4 VAC NO PRSV 0.5 ML IM: CPT | Performed by: FAMILY MEDICINE

## 2023-11-17 RX ORDER — LORATADINE 10 MG/1
10 TABLET ORAL DAILY
Qty: 90 TABLET | Refills: 1 | Status: SHIPPED | OUTPATIENT
Start: 2023-11-17 | End: 2024-05-15

## 2023-11-17 RX ORDER — FLUTICASONE PROPIONATE 50 MCG
1 SPRAY, SUSPENSION (ML) NASAL DAILY
Qty: 16 G | Refills: 2 | Status: SHIPPED | OUTPATIENT
Start: 2023-11-17

## 2023-11-17 ASSESSMENT — PATIENT HEALTH QUESTIONNAIRE - PHQ9
SUM OF ALL RESPONSES TO PHQ9 QUESTIONS 1 & 2: 0
SUM OF ALL RESPONSES TO PHQ QUESTIONS 1-9: 0
2. FEELING DOWN, DEPRESSED OR HOPELESS: 0
1. LITTLE INTEREST OR PLEASURE IN DOING THINGS: 0
SUM OF ALL RESPONSES TO PHQ QUESTIONS 1-9: 0

## 2023-11-17 NOTE — PROGRESS NOTES
Fingerstick for HBa1C done in middle finger by Augusta Wheeler LPN per order of Teodoro Morse MD after cleaning area with alcohol wipe. Patient tolerated procedure well. Rafael Perry is a 59 y.o. male who presents for routine immunizations. He denies any symptoms , reactions or allergies that would exclude them from being immunized today. Risks and adverse reactions were discussed and the VIS was given to them. All questions were addressed. He was observed for 15 min post injection. There were no reactions observed. Augusta Wheeler LPN     Left elbow pain  Chief Complaint   Patient presents with    Follow-up     Diabetes     1. \"Have you been to the ER, urgent care clinic since your last visit? Hospitalized since your last visit? \" No    2. \"Have you seen or consulted any other health care providers outside of the [address] [place] [address] since your last visit? \" No     3. For patients aged 43-73: Has the patient had a colonoscopy / FIT/ Cologuard?  Yes - no Care Gap present
05/05/2023 12:00 AM     Lab Results   Component Value Date/Time    CHOL 88 05/05/2023 12:00 AM    CHOL 95 06/17/2021 11:07 AM    HDL 29 05/05/2023 12:00 AM    VLDL 26 06/17/2021 11:07 AM       On this date 11/17/23 I have spent >30 minutes reviewing previous notes, test results and face to face with the patient for interview/exam, discussing working diagnosis and treatment plan. Medical decision making complexity: moderate-high.     Sierra Franks MD   Family & Geriatric Medicine

## 2023-12-04 DIAGNOSIS — E11.9 TYPE 2 DIABETES MELLITUS WITHOUT COMPLICATION, WITHOUT LONG-TERM CURRENT USE OF INSULIN (HCC): ICD-10-CM

## 2023-12-04 RX ORDER — SEMAGLUTIDE 2.68 MG/ML
2 INJECTION, SOLUTION SUBCUTANEOUS
Qty: 3 ML | Refills: 0 | Status: SHIPPED | OUTPATIENT
Start: 2023-12-04 | End: 2023-12-08 | Stop reason: SDUPTHER

## 2023-12-08 DIAGNOSIS — E11.9 TYPE 2 DIABETES MELLITUS WITHOUT COMPLICATION, WITHOUT LONG-TERM CURRENT USE OF INSULIN (HCC): ICD-10-CM

## 2023-12-08 RX ORDER — SEMAGLUTIDE 2.68 MG/ML
2 INJECTION, SOLUTION SUBCUTANEOUS
Qty: 3 ML | Refills: 1 | Status: SHIPPED | OUTPATIENT
Start: 2023-12-08 | End: 2023-12-08 | Stop reason: SDUPTHER

## 2023-12-08 RX ORDER — SEMAGLUTIDE 2.68 MG/ML
2 INJECTION, SOLUTION SUBCUTANEOUS
Qty: 3 ML | Refills: 1 | Status: SHIPPED | OUTPATIENT
Start: 2023-12-08

## 2023-12-08 NOTE — TELEPHONE ENCOUNTER
Per patient request and ok from PCP patient medication sent to [address] [facility], [address] [facility] out of [place].

## 2023-12-13 DIAGNOSIS — I10 ESSENTIAL (PRIMARY) HYPERTENSION: ICD-10-CM

## 2023-12-13 RX ORDER — LISINOPRIL AND HYDROCHLOROTHIAZIDE 25; 20 MG/1; MG/1
1 TABLET ORAL DAILY
Qty: 90 TABLET | Refills: 1 | Status: SHIPPED | OUTPATIENT
Start: 2023-12-13 | End: 2024-06-10

## 2023-12-13 RX ORDER — ATORVASTATIN CALCIUM 10 MG/1
TABLET, FILM COATED ORAL
Qty: 90 TABLET | Refills: 1 | Status: SHIPPED | OUTPATIENT
Start: 2023-12-13

## 2024-01-10 DIAGNOSIS — I10 ESSENTIAL (PRIMARY) HYPERTENSION: ICD-10-CM

## 2024-01-10 RX ORDER — AMLODIPINE BESYLATE 5 MG/1
5 TABLET ORAL DAILY
Qty: 90 TABLET | Refills: 1 | Status: SHIPPED | OUTPATIENT
Start: 2024-01-10

## 2024-01-27 DIAGNOSIS — E11.9 TYPE 2 DIABETES MELLITUS WITHOUT COMPLICATION, WITHOUT LONG-TERM CURRENT USE OF INSULIN (HCC): ICD-10-CM

## 2024-01-29 RX ORDER — SEMAGLUTIDE 2.68 MG/ML
INJECTION, SOLUTION SUBCUTANEOUS
Qty: 3 ML | Refills: 1 | Status: SHIPPED | OUTPATIENT
Start: 2024-01-29

## 2024-02-16 NOTE — PROGRESS NOTES
Fingerstick for HBa1C done in middle finger by Brandi Zavala LPN per order of Masha Shankar MD after cleaning area with alcohol wipe.  Patient tolerated procedure well.     Chief Complaint   Patient presents with    Follow-up     Chronic disease       \"Have you been to the ER, urgent care clinic since your last visit?  Hospitalized since your last visit?\"    NO    “Have you seen or consulted any other health care providers outside of [facility] since your last visit?”    NO

## 2024-02-19 ENCOUNTER — OFFICE VISIT (OUTPATIENT)
Facility: CLINIC | Age: 64
End: 2024-02-19
Payer: COMMERCIAL

## 2024-02-19 VITALS
RESPIRATION RATE: 20 BRPM | TEMPERATURE: 98.2 F | WEIGHT: 281 LBS | HEART RATE: 83 BPM | HEIGHT: 72 IN | OXYGEN SATURATION: 95 % | SYSTOLIC BLOOD PRESSURE: 121 MMHG | BODY MASS INDEX: 38.06 KG/M2 | DIASTOLIC BLOOD PRESSURE: 79 MMHG

## 2024-02-19 DIAGNOSIS — E78.2 MIXED HYPERLIPIDEMIA: ICD-10-CM

## 2024-02-19 DIAGNOSIS — E11.9 TYPE 2 DIABETES MELLITUS WITHOUT COMPLICATION, WITHOUT LONG-TERM CURRENT USE OF INSULIN (HCC): Primary | ICD-10-CM

## 2024-02-19 DIAGNOSIS — E66.01 SEVERE OBESITY (BMI 35.0-39.9) WITH COMORBIDITY (HCC): ICD-10-CM

## 2024-02-19 DIAGNOSIS — I10 ESSENTIAL (PRIMARY) HYPERTENSION: ICD-10-CM

## 2024-02-19 LAB — HBA1C MFR BLD: 7.9 %

## 2024-02-19 PROCEDURE — 3078F DIAST BP <80 MM HG: CPT | Performed by: FAMILY MEDICINE

## 2024-02-19 PROCEDURE — 99214 OFFICE O/P EST MOD 30 MIN: CPT | Performed by: FAMILY MEDICINE

## 2024-02-19 PROCEDURE — 83036 HEMOGLOBIN GLYCOSYLATED A1C: CPT | Performed by: FAMILY MEDICINE

## 2024-02-19 PROCEDURE — 3074F SYST BP LT 130 MM HG: CPT | Performed by: FAMILY MEDICINE

## 2024-02-19 RX ORDER — DAPAGLIFLOZIN 10 MG/1
10 TABLET, FILM COATED ORAL DAILY
Qty: 90 TABLET | Refills: 1 | Status: SHIPPED | OUTPATIENT
Start: 2024-02-19

## 2024-02-19 ASSESSMENT — PATIENT HEALTH QUESTIONNAIRE - PHQ9
SUM OF ALL RESPONSES TO PHQ9 QUESTIONS 1 & 2: 0
SUM OF ALL RESPONSES TO PHQ QUESTIONS 1-9: 0
2. FEELING DOWN, DEPRESSED OR HOPELESS: 0
1. LITTLE INTEREST OR PLEASURE IN DOING THINGS: 0

## 2024-02-19 NOTE — PROGRESS NOTES
Blaine Torres (: 1960) is a 63 y.o. male here for evaluation of the following chief concern(s):  Chronic condition management     ASSESSMENT/PLAN:  1. Type 2 diabetes mellitus without complication, without long-term current use of insulin (HCC)  -     COLLECTION CAPILLARY BLOOD SPECIMEN  -     AMB POC HEMOGLOBIN A1C  -     metFORMIN (GLUCOPHAGE) 1000 MG tablet; Take 1 tablet by mouth 2 times daily, Disp-180 tablet, R-1Normal  -     dapagliflozin (FARXIGA) 10 MG tablet; Take 1 tablet by mouth daily, Disp-90 tablet, R-1Normal  2. Essential (primary) hypertension  3. Mixed hyperlipidemia  4. Severe obesity (BMI 35.0-39.9) with comorbidity (HCC)    Mr. Torres appears to be doing well.  Normotensive.    Diabetic control is not as good as last check and weight has increased since last visit; pt states there is new opportunity to improve nutrition and exercise.    He plans to keep record of home BG reads to bring to next visit.      Continue current medications/care plan.       Follow-up in 3 months or sooner w/ partner.   We reviewed plans for ongoing coverage for our office during my upcoming maternity leave starting ~2024.      Blaine Torres agrees with plan as above and has no additional questions at this time.       SUBJECTIVE/OBJECTIVE:  Overall, pt is feeling \"good\".    No acute concerns, significant changes to health or chart updates since our last visit on 23.      2024 Body mass index is 38.11 kg/m².   2023 Body mass index is 37.32 kg/m².   2023 Body mass index is 37.05 kg/m².  2023; Body mass index is 37.16 kg/m².  He has lost 1 Lbs since last visit.     DMII:  HA1C  Dec 2021 8.4%> 2022 7.9%> Dec 2022 7.8%> May 2023 7.3%> 2023 7.6%> 2024 7.9%   2023 ualb:Cr <30  Pt has not been watching BG on a regular basis, no s/s of hypoglycemia.  Meds: Metformin 1,000 BID, Farxiga 10mg, Ozempic 2mg qweekly, Atorvastatin 10mg; no side effects.      HTN:  Goal

## 2024-03-08 ENCOUNTER — OFFICE VISIT (OUTPATIENT)
Dept: FAMILY MEDICINE CLINIC | Facility: CLINIC | Age: 64
End: 2024-03-08
Payer: COMMERCIAL

## 2024-03-08 VITALS
TEMPERATURE: 97.6 F | OXYGEN SATURATION: 95 % | SYSTOLIC BLOOD PRESSURE: 114 MMHG | HEART RATE: 81 BPM | DIASTOLIC BLOOD PRESSURE: 73 MMHG | HEIGHT: 72 IN | BODY MASS INDEX: 38.57 KG/M2 | WEIGHT: 284.8 LBS

## 2024-03-08 DIAGNOSIS — H66.002 NON-RECURRENT ACUTE SUPPURATIVE OTITIS MEDIA OF LEFT EAR WITHOUT SPONTANEOUS RUPTURE OF TYMPANIC MEMBRANE: Primary | ICD-10-CM

## 2024-03-08 PROCEDURE — 99213 OFFICE O/P EST LOW 20 MIN: CPT

## 2024-03-08 RX ORDER — AZITHROMYCIN 250 MG/1
TABLET, FILM COATED ORAL
Qty: 6 TABLET | Refills: 0 | Status: SHIPPED | OUTPATIENT
Start: 2024-03-08 | End: 2024-03-18

## 2024-03-08 ASSESSMENT — ENCOUNTER SYMPTOMS: RESPIRATORY NEGATIVE: 1

## 2024-03-08 NOTE — PROGRESS NOTES
Blaine Torres is a 63 y.o. male presents with   Chief Complaint   Patient presents with    Ear Fullness     Says left ear is itching and feels like he has water in it .        Diagnosis Orders   1. Non-recurrent acute suppurative otitis media of left ear without spontaneous rupture of tympanic membrane  azithromycin (ZITHROMAX) 250 MG tablet        /73   Pulse 81   Temp 97.6 °F (36.4 °C) (Temporal)   Ht 1.829 m (6')   Wt 129.2 kg (284 lb 12.8 oz)   SpO2 95%   BMI 38.63 kg/m²   Subjective:     Past Medical History:   Diagnosis Date    Diabetes (HCC)     Low testosterone     Male erectile dysfunction     Obesity (BMI 30-39.9)     Prostate cancer (HCC)     [id number] Ifrah 7 (3+4) adenocarcinoma of the prostate in 7/12 cores involving 20-70% of each specimen and [place] 6 disease in 5/12 cores involving 3-50% of each specimen     Past Surgical History:   Procedure Laterality Date    APPENDECTOMY      PROSTATECTOMY  2016    D[facility] Dr Macias     Social History     Socioeconomic History    Marital status:      Spouse name: None    Number of children: None    Years of education: None    Highest education level: None   Tobacco Use    Smoking status: Former     Current packs/day: 0.00     Types: Cigarettes     Quit date: 2010     Years since quittin.1    Smokeless tobacco: Never   Vaping Use    Vaping Use: Never used   Substance and Sexual Activity    Alcohol use: Yes    Drug use: Never     Social Determinants of Health     Financial Resource Strain: Low Risk  (2023)    Overall Financial Resource Strain (CARDIA)     Difficulty of Paying Living Expenses: Not hard at all   Transportation Needs: Unknown (2023)    PRAPARE - Transportation     Lack of Transportation (Non-Medical): No   Housing Stability: Unknown (2023)    Housing Stability Vital Sign     Unstable Housing in the Last Year: No       Allergies   Allergen Reactions    Penicillins Other (See Comments)     The patient has a

## 2024-03-08 NOTE — PROGRESS NOTES
Blaine Torres presents today for   Chief Complaint   Patient presents with    Ear Fullness     Says left ear is itching and feels like he has water in it .       Is someone accompanying this pt? No     Is the patient using any DME equipment during OV? No     Depression Screenin/19/2024     1:58 PM 2023     8:27 AM 2023     8:17 AM 2023    11:01 AM 2023     8:14 AM 2022     8:26 AM 2022    10:32 AM   PHQ-9 Questionaire   Little interest or pleasure in doing things 0 0 0 0 0 0 0   Feeling down, depressed, or hopeless 0 0 0 0 0 0 0   PHQ-9 Total Score 0 0 0 0 0 0 0       Fall Risk       No data to display                 Health Maintenance reviewed and discussed and ordered per Provider.    Health Maintenance Due   Topic Date Due    HIV screen  Never done    Shingles vaccine (1 of 2) Never done    Pneumococcal 0-64 years Vaccine (2 - PCV) 2017    Respiratory Syncytial Virus (RSV) Pregnant or age 60 yrs+ (1 - 1-dose 60+ series) Never done    COVID-19 Vaccine (3 -  season) 2023    Prostate Specific Antigen (PSA) Screening or Monitoring  10/26/2023    Diabetic retinal exam  2023   .        \"Have you been to the ER, urgent care clinic since your last visit?  Hospitalized since your last visit?\"    NO    “Have you seen or consulted any other health care providers outside of [facility] since your last visit?”    NO

## 2024-03-28 DIAGNOSIS — E11.9 TYPE 2 DIABETES MELLITUS WITHOUT COMPLICATION, WITHOUT LONG-TERM CURRENT USE OF INSULIN (HCC): ICD-10-CM

## 2024-03-28 RX ORDER — SEMAGLUTIDE 2.68 MG/ML
INJECTION, SOLUTION SUBCUTANEOUS
Qty: 3 ML | Refills: 1 | Status: SHIPPED | OUTPATIENT
Start: 2024-03-28

## 2024-04-01 ENCOUNTER — TELEPHONE (OUTPATIENT)
Facility: CLINIC | Age: 64
End: 2024-04-01

## 2024-04-01 NOTE — TELEPHONE ENCOUNTER
Patient wife called and states that [facility] and [facility] are out of Ozempic and patient was supposed to do his shot for DM last Friday 3/29/2024. Called [facility] and they do have 2 mg, gave verbal order for 1 month with 1 refill.

## 2024-05-27 NOTE — PROGRESS NOTES
Blaine Torres (: 1960) is a 63 y.o. male patient, here for evaluation of the following chief complaint(s):  Follow-up (Chronic condition)       ASSESSMENT/PLAN:  Below is the assessment and plan developed based on review of pertinent history, physical exam, labs, studies, and medications.    1. Type 2 diabetes mellitus without complication, without long-term current use of insulin (HCC)  Assessment & Plan:   Improved  Dietary changes as discussed reduction of intake of junk foods and sweets  3-month follow-up with Dr. Byrne primary care  Orders:  -     AMB POC HEMOGLOBIN A1C  -     COLLECTION CAPILLARY BLOOD SPECIMEN    Results for orders placed or performed in visit on 24   AMB POC HEMOGLOBIN A1C   Result Value Ref Range    Hemoglobin A1C, POC 7.3 %        Return in about 2 months (around 2024) for with dr rubin .      I have discussed the diagnosis with the patient and the intended plan as seen in the above orders.  Questions were answered concerning future plans.  I have discussed medication side effects and warnings with the patient as well. I have reviewed the plan of care with the patient, accepted their input and they are in agreement with the treatment goals. Previous lab and imaging results were reviewed by me.     I have performed a medically appropriate exam, ordering tests and medications, counseling and educating, and documenting in the EMR     I recommend this patient have regular follow-up appointments every   months for general health evaluations and management   Sooner if indicated     Past Medical History  Past Medical History:   Diagnosis Date    Diabetes (HCC)     Low testosterone     Male erectile dysfunction     Obesity (BMI 30-39.9)     Prostate cancer (HCC)     [id number] Ifrah 7 (3+4) adenocarcinoma of the prostate in 7/12 cores involving 20-70% of each specimen and [place] 6 disease in 5/12 cores involving 3-50% of each specimen          SUBJECTIVE/OBJECTIVE:    HPI:

## 2024-05-28 SDOH — ECONOMIC STABILITY: FOOD INSECURITY: WITHIN THE PAST 12 MONTHS, THE FOOD YOU BOUGHT JUST DIDN'T LAST AND YOU DIDN'T HAVE MONEY TO GET MORE.: NEVER TRUE

## 2024-05-28 SDOH — ECONOMIC STABILITY: INCOME INSECURITY: HOW HARD IS IT FOR YOU TO PAY FOR THE VERY BASICS LIKE FOOD, HOUSING, MEDICAL CARE, AND HEATING?: NOT HARD AT ALL

## 2024-05-28 SDOH — ECONOMIC STABILITY: FOOD INSECURITY: WITHIN THE PAST 12 MONTHS, YOU WORRIED THAT YOUR FOOD WOULD RUN OUT BEFORE YOU GOT MONEY TO BUY MORE.: NEVER TRUE

## 2024-05-28 SDOH — ECONOMIC STABILITY: TRANSPORTATION INSECURITY
IN THE PAST 12 MONTHS, HAS LACK OF TRANSPORTATION KEPT YOU FROM MEETINGS, WORK, OR FROM GETTING THINGS NEEDED FOR DAILY LIVING?: NO

## 2024-05-29 DIAGNOSIS — E11.9 TYPE 2 DIABETES MELLITUS WITHOUT COMPLICATION, WITHOUT LONG-TERM CURRENT USE OF INSULIN (HCC): ICD-10-CM

## 2024-05-30 RX ORDER — SEMAGLUTIDE 2.68 MG/ML
INJECTION, SOLUTION SUBCUTANEOUS
Qty: 3 ML | Refills: 1 | Status: SHIPPED | OUTPATIENT
Start: 2024-05-30

## 2024-05-31 ENCOUNTER — OFFICE VISIT (OUTPATIENT)
Facility: CLINIC | Age: 64
End: 2024-05-31
Payer: COMMERCIAL

## 2024-05-31 VITALS
DIASTOLIC BLOOD PRESSURE: 65 MMHG | RESPIRATION RATE: 18 BRPM | BODY MASS INDEX: 36.7 KG/M2 | HEIGHT: 72 IN | OXYGEN SATURATION: 95 % | HEART RATE: 74 BPM | SYSTOLIC BLOOD PRESSURE: 102 MMHG | TEMPERATURE: 98.2 F | WEIGHT: 271 LBS

## 2024-05-31 DIAGNOSIS — E11.9 TYPE 2 DIABETES MELLITUS WITHOUT COMPLICATION, WITHOUT LONG-TERM CURRENT USE OF INSULIN (HCC): Primary | ICD-10-CM

## 2024-05-31 LAB — HBA1C MFR BLD: 7.3 %

## 2024-05-31 PROCEDURE — 99213 OFFICE O/P EST LOW 20 MIN: CPT | Performed by: NURSE PRACTITIONER

## 2024-05-31 PROCEDURE — 83036 HEMOGLOBIN GLYCOSYLATED A1C: CPT | Performed by: NURSE PRACTITIONER

## 2024-05-31 NOTE — PROGRESS NOTES
Chief Complaint   Patient presents with    Follow-up     Chronic condition       \"Have you been to the ER, urgent care clinic since your last visit?  Hospitalized since your last visit?\"    NO    “Have you seen or consulted any other health care providers outside of [facility] since your last visit?”    NO

## 2024-05-31 NOTE — ASSESSMENT & PLAN NOTE
Improved  Dietary changes as discussed reduction of intake of junk foods and sweets  3-month follow-up with Dr. Byrne primary care

## 2024-06-12 RX ORDER — ATORVASTATIN CALCIUM 10 MG/1
TABLET, FILM COATED ORAL
Qty: 90 TABLET | Refills: 0 | Status: SHIPPED | OUTPATIENT
Start: 2024-06-12

## 2024-07-06 DIAGNOSIS — I10 ESSENTIAL (PRIMARY) HYPERTENSION: ICD-10-CM

## 2024-07-08 RX ORDER — LISINOPRIL AND HYDROCHLOROTHIAZIDE 25; 20 MG/1; MG/1
1 TABLET ORAL DAILY
Qty: 90 TABLET | Refills: 1 | Status: SHIPPED | OUTPATIENT
Start: 2024-07-08

## 2024-07-08 RX ORDER — AMLODIPINE BESYLATE 5 MG/1
5 TABLET ORAL DAILY
Qty: 90 TABLET | Refills: 1 | Status: SHIPPED | OUTPATIENT
Start: 2024-07-08

## 2024-07-10 ENCOUNTER — OFFICE VISIT (OUTPATIENT)
Facility: CLINIC | Age: 64
End: 2024-07-10
Payer: COMMERCIAL

## 2024-07-10 VITALS
WEIGHT: 271.2 LBS | OXYGEN SATURATION: 94 % | RESPIRATION RATE: 20 BRPM | BODY MASS INDEX: 36.73 KG/M2 | SYSTOLIC BLOOD PRESSURE: 103 MMHG | DIASTOLIC BLOOD PRESSURE: 64 MMHG | TEMPERATURE: 97.2 F | HEART RATE: 77 BPM | HEIGHT: 72 IN

## 2024-07-10 DIAGNOSIS — I10 ESSENTIAL (PRIMARY) HYPERTENSION: ICD-10-CM

## 2024-07-10 DIAGNOSIS — E11.9 TYPE 2 DIABETES MELLITUS WITHOUT COMPLICATION, WITHOUT LONG-TERM CURRENT USE OF INSULIN (HCC): ICD-10-CM

## 2024-07-10 DIAGNOSIS — Z85.46 HISTORY OF PROSTATE CANCER: ICD-10-CM

## 2024-07-10 DIAGNOSIS — R14.0 BLOATING: ICD-10-CM

## 2024-07-10 DIAGNOSIS — Z13.6 ENCOUNTER FOR ABDOMINAL AORTIC ANEURYSM (AAA) SCREENING: ICD-10-CM

## 2024-07-10 DIAGNOSIS — Z00.00 HEALTHCARE MAINTENANCE: Primary | ICD-10-CM

## 2024-07-10 DIAGNOSIS — E78.2 MIXED HYPERLIPIDEMIA: ICD-10-CM

## 2024-07-10 PROCEDURE — 3078F DIAST BP <80 MM HG: CPT | Performed by: FAMILY MEDICINE

## 2024-07-10 PROCEDURE — 36415 COLL VENOUS BLD VENIPUNCTURE: CPT | Performed by: FAMILY MEDICINE

## 2024-07-10 PROCEDURE — 99396 PREV VISIT EST AGE 40-64: CPT | Performed by: FAMILY MEDICINE

## 2024-07-10 PROCEDURE — 3074F SYST BP LT 130 MM HG: CPT | Performed by: FAMILY MEDICINE

## 2024-07-10 RX ORDER — SIMETHICONE 80 MG
80 TABLET,CHEWABLE ORAL 2 TIMES DAILY PRN
Qty: 30 TABLET | Refills: 1 | Status: SHIPPED | OUTPATIENT
Start: 2024-07-10

## 2024-07-10 ASSESSMENT — PATIENT HEALTH QUESTIONNAIRE - PHQ9
SUM OF ALL RESPONSES TO PHQ QUESTIONS 1-9: 0
SUM OF ALL RESPONSES TO PHQ QUESTIONS 1-9: 0
SUM OF ALL RESPONSES TO PHQ9 QUESTIONS 1 & 2: 0
1. LITTLE INTEREST OR PLEASURE IN DOING THINGS: NOT AT ALL
SUM OF ALL RESPONSES TO PHQ QUESTIONS 1-9: 0
2. FEELING DOWN, DEPRESSED OR HOPELESS: NOT AT ALL
SUM OF ALL RESPONSES TO PHQ QUESTIONS 1-9: 0

## 2024-07-10 NOTE — PROGRESS NOTES
Blaine Torres (: 1960) is a 63 y.o. male here for evaluation of the following chief concern(s):  Health maintenance   Chronic condition management     ASSESSMENT/PLAN:  1. Healthcare maintenance  2. Essential (primary) hypertension  -     Comprehensive Metabolic Panel; Future  -     CBC with Auto Differential; Future  3. Mixed hyperlipidemia  -     Lipid Panel; Future  -      - Venipuncture  4. Type 2 diabetes mellitus without complication, without long-term current use of insulin (HCC)  -     Comprehensive Metabolic Panel; Future  5. Bloating  -     simethicone (MYLICON) 80 MG chewable tablet; Take 1 tablet by mouth 2 times daily as needed (bloating), Disp-30 tablet, R-1Normal  6. History of prostate cancer  -     PSA, Diagnostic; Future  7. Encounter for abdominal aortic aneurysm (AAA) screening  -     US SCREENING FOR AAA; Future    Mr. Torres appears to be doing well.  Normotensive.  Diabetes is controlled.  Pt on statin and antiplatelet therapy.      Nursing to assist w/ obtain pathology report for 2019 colonoscopy.      Return in about 3 months (around 10/10/2024) for follow-up chronic conditions or sooner if needed.    Blaine Torres agrees with plan as above and has no additional questions at this time.       SUBJECTIVE/OBJECTIVE:  Overall, pt is feeling \"good\".    No acute concerns, significant changes to health or chart updates since our last visit.    Pt working to drink more water, eating less sweets.   He has been busy with work.      2024 Body mass index is 36.78 kg/m².  2024 Body mass index is 38.11 kg/m².   2023 Body mass index is 37.32 kg/m².   2023 Body mass index is 37.05 kg/m².  2023; Body mass index is 37.16 kg/m².  He has lost 1 Lbs since last visit.     DMII:  HA1C  Dec 2021 8.4%> 2022 7.9%> Dec 2022 7.8%> May 2023 7.3%> 2023 7.6%> 2024 7.9%> 2024 7.3%   2023 ualb:Cr <30   Pt has not been watching BG on a regular basis, no s/s of

## 2024-07-10 NOTE — PROGRESS NOTES
Chief Complaint   Patient presents with    Annual Exam       \"Have you been to the ER, urgent care clinic since your last visit?  Hospitalized since your last visit?\"    NO    “Have you seen or consulted any other health care providers outside of [facility] since your last visit?”    NO     Verbal order from Masha Shankar MD to order labs/sign and draw them in office    Labs were drawn and sent to [facility] by Brandi Zavala LPN:    The following tubes were sent:    1 Lavendar, 0 Red, 1 SST, 0 Urine    Draw site right antecubital.  Patient tolerated draw with no distress.

## 2024-07-11 LAB
ALBUMIN SERPL-MCNC: 4.4 G/DL (ref 3.9–4.9)
ALP SERPL-CCNC: 52 IU/L (ref 44–121)
ALT SERPL-CCNC: 36 IU/L (ref 0–44)
AST SERPL-CCNC: 30 IU/L (ref 0–40)
BASOPHILS # BLD AUTO: 0.1 X10E3/UL (ref 0–0.2)
BASOPHILS NFR BLD AUTO: 1 %
BILIRUB SERPL-MCNC: 0.4 MG/DL (ref 0–1.2)
BUN SERPL-MCNC: 10 MG/DL (ref 8–27)
BUN/CREAT SERPL: 11 (ref 10–24)
CALCIUM SERPL-MCNC: 9.6 MG/DL (ref 8.6–10.2)
CHLORIDE SERPL-SCNC: 100 MMOL/L (ref 96–106)
CHOLEST SERPL-MCNC: 88 MG/DL (ref 100–199)
CO2 SERPL-SCNC: 20 MMOL/L (ref 20–29)
CREAT SERPL-MCNC: 0.93 MG/DL (ref 0.76–1.27)
EGFRCR SERPLBLD CKD-EPI 2021: 93 ML/MIN/1.73
EOSINOPHIL # BLD AUTO: 0.2 X10E3/UL (ref 0–0.4)
EOSINOPHIL NFR BLD AUTO: 2 %
ERYTHROCYTE [DISTWIDTH] IN BLOOD BY AUTOMATED COUNT: 13.6 % (ref 11.6–15.4)
GLOBULIN SER CALC-MCNC: 2.2 G/DL (ref 1.5–4.5)
GLUCOSE SERPL-MCNC: 125 MG/DL (ref 70–99)
HCT VFR BLD AUTO: 48.7 % (ref 37.5–51)
HDLC SERPL-MCNC: 30 MG/DL
HGB BLD-MCNC: 16.2 G/DL (ref 13–17.7)
IMM GRANULOCYTES # BLD AUTO: 0 X10E3/UL (ref 0–0.1)
IMM GRANULOCYTES NFR BLD AUTO: 0 %
LDLC SERPL CALC-MCNC: 30 MG/DL (ref 0–99)
LYMPHOCYTES # BLD AUTO: 1.9 X10E3/UL (ref 0.7–3.1)
LYMPHOCYTES NFR BLD AUTO: 28 %
MCH RBC QN AUTO: 29 PG (ref 26.6–33)
MCHC RBC AUTO-ENTMCNC: 33.3 G/DL (ref 31.5–35.7)
MCV RBC AUTO: 87 FL (ref 79–97)
MONOCYTES # BLD AUTO: 0.5 X10E3/UL (ref 0.1–0.9)
MONOCYTES NFR BLD AUTO: 8 %
NEUTROPHILS # BLD AUTO: 4.1 X10E3/UL (ref 1.4–7)
NEUTROPHILS NFR BLD AUTO: 61 %
PLATELET # BLD AUTO: 258 X10E3/UL (ref 150–450)
POTASSIUM SERPL-SCNC: 4.1 MMOL/L (ref 3.5–5.2)
PROT SERPL-MCNC: 6.6 G/DL (ref 6–8.5)
PSA SERPL-MCNC: <0.1 NG/ML (ref 0–4)
RBC # BLD AUTO: 5.58 X10E6/UL (ref 4.14–5.8)
SODIUM SERPL-SCNC: 139 MMOL/L (ref 134–144)
SPECIMEN STATUS REPORT: NORMAL
TRIGL SERPL-MCNC: 167 MG/DL (ref 0–149)
VLDLC SERPL CALC-MCNC: 28 MG/DL (ref 5–40)
WBC # BLD AUTO: 6.7 X10E3/UL (ref 3.4–10.8)

## 2024-07-18 ENCOUNTER — COMMUNITY OUTREACH (OUTPATIENT)
Facility: CLINIC | Age: 64
End: 2024-07-18

## 2024-07-29 DIAGNOSIS — E11.9 TYPE 2 DIABETES MELLITUS WITHOUT COMPLICATION, WITHOUT LONG-TERM CURRENT USE OF INSULIN (HCC): ICD-10-CM

## 2024-07-29 RX ORDER — SEMAGLUTIDE 2.68 MG/ML
INJECTION, SOLUTION SUBCUTANEOUS
Qty: 3 ML | Refills: 3 | Status: SHIPPED | OUTPATIENT
Start: 2024-07-29

## 2024-08-20 DIAGNOSIS — E11.9 TYPE 2 DIABETES MELLITUS WITHOUT COMPLICATION, WITHOUT LONG-TERM CURRENT USE OF INSULIN (HCC): ICD-10-CM

## 2024-08-21 RX ORDER — DAPAGLIFLOZIN 10 MG/1
10 TABLET, FILM COATED ORAL DAILY
Qty: 90 TABLET | Refills: 1 | Status: SHIPPED | OUTPATIENT
Start: 2024-08-21

## 2024-09-11 RX ORDER — ATORVASTATIN CALCIUM 10 MG/1
TABLET, FILM COATED ORAL
Qty: 90 TABLET | Refills: 1 | Status: SHIPPED | OUTPATIENT
Start: 2024-09-11

## 2024-11-21 ENCOUNTER — OFFICE VISIT (OUTPATIENT)
Facility: CLINIC | Age: 64
End: 2024-11-21

## 2024-11-21 VITALS
RESPIRATION RATE: 20 BRPM | SYSTOLIC BLOOD PRESSURE: 114 MMHG | OXYGEN SATURATION: 95 % | BODY MASS INDEX: 37.11 KG/M2 | TEMPERATURE: 97.2 F | DIASTOLIC BLOOD PRESSURE: 64 MMHG | HEIGHT: 72 IN | WEIGHT: 274 LBS | HEART RATE: 87 BPM

## 2024-11-21 DIAGNOSIS — G47.33 OSA (OBSTRUCTIVE SLEEP APNEA): ICD-10-CM

## 2024-11-21 DIAGNOSIS — Z23 NEED FOR IMMUNIZATION AGAINST INFLUENZA: ICD-10-CM

## 2024-11-21 DIAGNOSIS — E11.9 TYPE 2 DIABETES MELLITUS WITHOUT COMPLICATION, WITHOUT LONG-TERM CURRENT USE OF INSULIN (HCC): Primary | ICD-10-CM

## 2024-11-21 DIAGNOSIS — J30.1 SEASONAL ALLERGIC RHINITIS DUE TO POLLEN: ICD-10-CM

## 2024-11-21 LAB — HBA1C MFR BLD: 7.4 %

## 2024-11-21 RX ORDER — MONTELUKAST SODIUM 10 MG/1
10 TABLET ORAL
Qty: 30 TABLET | Refills: 2 | Status: SHIPPED | OUTPATIENT
Start: 2024-11-21

## 2024-11-21 RX ORDER — SEMAGLUTIDE 2.68 MG/ML
INJECTION, SOLUTION SUBCUTANEOUS
Qty: 3 ML | Refills: 3 | Status: SHIPPED | OUTPATIENT
Start: 2024-11-21

## 2024-11-21 RX ORDER — LORATADINE 10 MG/1
10 TABLET ORAL DAILY
Qty: 90 TABLET | Refills: 1 | Status: SHIPPED | OUTPATIENT
Start: 2024-11-21 | End: 2025-05-20

## 2024-11-21 ASSESSMENT — PATIENT HEALTH QUESTIONNAIRE - PHQ9
1. LITTLE INTEREST OR PLEASURE IN DOING THINGS: NOT AT ALL
SUM OF ALL RESPONSES TO PHQ9 QUESTIONS 1 & 2: 0
SUM OF ALL RESPONSES TO PHQ QUESTIONS 1-9: 0
2. FEELING DOWN, DEPRESSED OR HOPELESS: NOT AT ALL
SUM OF ALL RESPONSES TO PHQ QUESTIONS 1-9: 0

## 2024-11-21 NOTE — PROGRESS NOTES
Chief Complaint   Patient presents with    Follow-up     Chronic disease       \"Have you been to the ER, urgent care clinic since your last visit?  Hospitalized since your last visit?\"    NO    “Have you seen or consulted any other health care providers outside our system since your last visit?”    NO      “Have you had a diabetic eye exam?”    NO   Eye Exam is scheduled with Dr Cisneros  Date of last diabetic eye exam: 11/7/2022     Fingerstick for HBa1C done in middle finger by Brandi Zavala LPN per order of Masha Shankar MD after cleaning area with alcohol wipe.  Patient tolerated procedure well.     Blaine Torres is a 64 y.o. male who presents for routine immunizations.   He denies any symptoms , reactions or allergies that would exclude them from being immunized today.  Risks and adverse reactions were discussed and the VIS was given to them. All questions were addressed.  He was observed for 15 min post injection. There were no reactions observed.    Brandi Zavala LPN

## 2024-11-21 NOTE — PROGRESS NOTES
Blaine Torres (: 1960) is a 64 y.o. male here for evaluation of the following chief concern(s):  Health maintenance   Chronic condition management     ASSESSMENT/PLAN:  1. Type 2 diabetes mellitus without complication, without long-term current use of insulin (HCC)  -     Microalbumin / creatinine urine ratio; Future  -     AMB POC HEMOGLOBIN A1C  -     COLLECTION CAPILLARY BLOOD SPECIMEN  -     semaglutide, 2 MG/DOSE, (OZEMPIC, 2 MG/DOSE,) 8 MG/3ML SOPN sc injection; INJECT 2 MG UNDER THE SKIN ONCE WEEKLY, Disp-3 mL, R-3Normal  2. GIOVANNI (obstructive sleep apnea)  -     [facility], , Sleep Medicine ([facility])  3. Seasonal allergic rhinitis due to pollen  -     loratadine (CLARITIN) 10 MG tablet; Take 1 tablet by mouth daily, Disp-90 tablet, R-1Normal  -     montelukast (SINGULAIR) 10 MG tablet; Take 1 tablet by mouth nightly .  For use during allergy season., Disp-30 tablet, R-2Normal  4. Need for immunization against influenza  -     THER/PROPH/DIAG INJECTION, SUBCUT/IM  -     Influenza, FLUCELVAX Trivalent, (age 6 mo+) IM, Preservative Free, 0.5mL    Mr. Torres appears to be doing well.    Normotensive.    Diabetes remains controlled.    Refer back to Sleep Medicine to discuss options for continuity of care w/ hx untreated GIOVANNI, including CPAP education.  We reviewed long-term health risks of untreated sleep apnea.      Trial of leukotriene inhibitor in addition to continuing antihistamine to see if this helps w/ sinus symptoms; if not improved in ~2 weeks pt can schedule a follow-up to discuss trial of Azelastine and/or referral to ENT.      Pt reminded of outstanding AAA screen, provided number for scheduling.      I encouraged pt to continue focus on opportunities to improve nutrition, hydration, exercise outside of work as tolerated, rest/relaxation/stress mitigation, quality sleep.    Otherwise, continue current care plan for stable chronic conditions as above.      Return in about 3 months

## 2024-11-22 LAB
ALBUMIN/CREAT UR: <4 MG/G CREAT (ref 0–29)
CREAT UR-MCNC: 83.7 MG/DL
MICROALBUMIN UR-MCNC: <3 UG/ML

## 2025-01-07 DIAGNOSIS — I10 ESSENTIAL (PRIMARY) HYPERTENSION: ICD-10-CM

## 2025-01-07 RX ORDER — AMLODIPINE BESYLATE 5 MG/1
5 TABLET ORAL DAILY
Qty: 90 TABLET | Refills: 1 | Status: SHIPPED | OUTPATIENT
Start: 2025-01-07

## 2025-01-07 RX ORDER — LISINOPRIL AND HYDROCHLOROTHIAZIDE 20; 25 MG/1; MG/1
1 TABLET ORAL DAILY
Qty: 90 TABLET | Refills: 1 | Status: SHIPPED | OUTPATIENT
Start: 2025-01-07

## 2025-01-07 NOTE — TELEPHONE ENCOUNTER
Patient request RX for Meloxicam 15 mg. Medication is not on his med list. Message sent to patient wife to see if ordering provider will refill or we can discuss at his next appointment.

## 2025-02-24 DIAGNOSIS — E11.9 TYPE 2 DIABETES MELLITUS WITHOUT COMPLICATION, WITHOUT LONG-TERM CURRENT USE OF INSULIN (HCC): ICD-10-CM

## 2025-02-24 RX ORDER — DAPAGLIFLOZIN 10 MG/1
10 TABLET, FILM COATED ORAL DAILY
Qty: 90 TABLET | Refills: 0 | Status: SHIPPED | OUTPATIENT
Start: 2025-02-24

## 2025-03-13 ENCOUNTER — OFFICE VISIT (OUTPATIENT)
Facility: CLINIC | Age: 65
End: 2025-03-13

## 2025-03-13 VITALS
TEMPERATURE: 98.6 F | BODY MASS INDEX: 37.76 KG/M2 | OXYGEN SATURATION: 95 % | HEIGHT: 72 IN | HEART RATE: 77 BPM | DIASTOLIC BLOOD PRESSURE: 71 MMHG | RESPIRATION RATE: 20 BRPM | SYSTOLIC BLOOD PRESSURE: 112 MMHG | WEIGHT: 278.8 LBS

## 2025-03-13 DIAGNOSIS — E11.9 TYPE 2 DIABETES MELLITUS WITHOUT COMPLICATION, WITHOUT LONG-TERM CURRENT USE OF INSULIN (HCC): ICD-10-CM

## 2025-03-13 DIAGNOSIS — E66.01 MORBID (SEVERE) OBESITY DUE TO EXCESS CALORIES: ICD-10-CM

## 2025-03-13 DIAGNOSIS — E78.2 MIXED HYPERLIPIDEMIA: Primary | ICD-10-CM

## 2025-03-13 DIAGNOSIS — Z00.00 ROUTINE HEALTH MAINTENANCE: ICD-10-CM

## 2025-03-13 DIAGNOSIS — Z85.46 HISTORY OF PROSTATE CANCER: ICD-10-CM

## 2025-03-13 LAB — HBA1C MFR BLD: 7.8 %

## 2025-03-13 RX ORDER — SEMAGLUTIDE 2.68 MG/ML
INJECTION, SOLUTION SUBCUTANEOUS
Qty: 3 ML | Refills: 3 | Status: SHIPPED | OUTPATIENT
Start: 2025-03-13 | End: 2025-03-13

## 2025-03-13 RX ORDER — ATORVASTATIN CALCIUM 10 MG/1
10 TABLET, FILM COATED ORAL DAILY
Qty: 90 TABLET | Refills: 1 | Status: SHIPPED | OUTPATIENT
Start: 2025-03-13

## 2025-03-13 RX ORDER — SEMAGLUTIDE 2.68 MG/ML
INJECTION, SOLUTION SUBCUTANEOUS
Qty: 3 ML | Refills: 3 | Status: SHIPPED | OUTPATIENT
Start: 2025-03-13

## 2025-03-13 SDOH — ECONOMIC STABILITY: FOOD INSECURITY: WITHIN THE PAST 12 MONTHS, THE FOOD YOU BOUGHT JUST DIDN'T LAST AND YOU DIDN'T HAVE MONEY TO GET MORE.: NEVER TRUE

## 2025-03-13 SDOH — ECONOMIC STABILITY: FOOD INSECURITY: WITHIN THE PAST 12 MONTHS, YOU WORRIED THAT YOUR FOOD WOULD RUN OUT BEFORE YOU GOT MONEY TO BUY MORE.: NEVER TRUE

## 2025-03-13 ASSESSMENT — PATIENT HEALTH QUESTIONNAIRE - PHQ9
SUM OF ALL RESPONSES TO PHQ QUESTIONS 1-9: 0
1. LITTLE INTEREST OR PLEASURE IN DOING THINGS: NOT AT ALL
2. FEELING DOWN, DEPRESSED OR HOPELESS: NOT AT ALL
SUM OF ALL RESPONSES TO PHQ QUESTIONS 1-9: 0

## 2025-03-13 NOTE — PROGRESS NOTES
Blaine Torres (: 1960) is a 64 y.o. male here for evaluation of the following chief concern(s):  Chronic condition management     ASSESSMENT/PLAN:  1. Mixed hyperlipidemia  -     atorvastatin (LIPITOR) 10 MG tablet; Take 1 tablet by mouth daily TAKE 1 TABLET BY MOUTH NIGHTLY AT BEDTIME, Disp-90 tablet, R-1Normal  -     Lipid Panel; Future  2. Type 2 diabetes mellitus without complication, without long-term current use of insulin (HCC)  -     AMB POC HEMOGLOBIN A1C  -     COLLECTION CAPILLARY BLOOD SPECIMEN  -     Albumin/Creatinine Ratio, Urine; Future  -     Hemoglobin A1C; Future  -     semaglutide, 2 MG/DOSE, (OZEMPIC, 2 MG/DOSE,) 8 MG/3ML SOPN sc injection; INJECT 2 MG UNDER THE SKIN ONCE WEEKLY, Disp-3 mL, R-3Normal  3. Routine health maintenance  -     Comprehensive Metabolic Panel; Future  -     CBC with Auto Differential; Future  -     Lipid Panel; Future  4. History of prostate cancer  -     PSA, Diagnostic; Future  5. Morbid (severe) obesity due to excess calories (E66.01)  6. Body mass index [BMI] 37.0-37.9, adult (Z68.37)    Mr. Torres appears to be doing well.  Normotensive.  BMI up-trending since 2024.  Diabetes remains controlled.  Pt to continue focus on opportunities to improve nutrition, hydration, activity/exercise as tolerated.    Otherwise, continue current care plan for stable chronic conditions as above.      Return in about 15 weeks (around 2025) for follow-up chronic conditions or sooner if needed, labs 2-3 days prior to appointment.    Blaine Torres agrees with plan as above and has no additional questions at this time.       SUBJECTIVE/OBJECTIVE:  Overall, pt is feeling \"good\".    No acute concerns, significant changes to health or chart updates since our last visit.    2025 Body mass index is 37.81 kg/m².  2024 Body mass index is 37.16 kg/m².  2024 Body mass index is 36.78 kg/m².  2024 Body mass index is 38.11 kg/m².   2023 Body mass index is 37.32

## 2025-03-13 NOTE — PROGRESS NOTES
Chief Complaint   Patient presents with    Follow-up     Chronic disease   Fingerstick for HBa1C done in middle finger by Brandi Zavala LPN per order of Masha Shankar MD after cleaning area with alcohol wipe.  Patient tolerated procedure well.     \"Have you been to the ER, urgent care clinic since your last visit?  Hospitalized since your last visit?\"    NO    “Have you seen or consulted any other health care providers outside our system since your last visit?”    NO      “Have you had a diabetic eye exam?”    YES - Where: Dr. Cisneros  Nurse/BERE to request most recent records if not in the chart     Date of last diabetic eye exam: 11/7/2022

## 2025-03-31 ENCOUNTER — OFFICE VISIT (OUTPATIENT)
Facility: CLINIC | Age: 65
End: 2025-03-31
Payer: COMMERCIAL

## 2025-03-31 VITALS — WEIGHT: 278 LBS | HEIGHT: 72 IN | BODY MASS INDEX: 37.65 KG/M2 | RESPIRATION RATE: 20 BRPM

## 2025-03-31 DIAGNOSIS — H65.93 MIDDLE EAR EFFUSION, BILATERAL: ICD-10-CM

## 2025-03-31 DIAGNOSIS — H69.93 DYSFUNCTION OF BOTH EUSTACHIAN TUBES: ICD-10-CM

## 2025-03-31 DIAGNOSIS — J30.1 SEASONAL ALLERGIC RHINITIS DUE TO POLLEN: ICD-10-CM

## 2025-03-31 DIAGNOSIS — J30.89 SEASONAL ALLERGIC RHINITIS DUE TO OTHER ALLERGIC TRIGGER: ICD-10-CM

## 2025-03-31 DIAGNOSIS — R68.89 FLU-LIKE SYMPTOMS: Primary | ICD-10-CM

## 2025-03-31 LAB
EXP DATE SOLUTION: NORMAL
EXP DATE SWAB: NORMAL
EXPIRATION DATE: NORMAL
INFLUENZA A ANTIGEN, POC: NEGATIVE
INFLUENZA B ANTIGEN, POC: NEGATIVE
LOT NUMBER POC: NORMAL
LOT NUMBER SOLUTION: NORMAL
LOT NUMBER SWAB: NORMAL
SARS-COV-2 RNA, POC: NEGATIVE
VALID INTERNAL CONTROL, POC: YES

## 2025-03-31 PROCEDURE — 87635 SARS-COV-2 COVID-19 AMP PRB: CPT | Performed by: FAMILY MEDICINE

## 2025-03-31 PROCEDURE — 99213 OFFICE O/P EST LOW 20 MIN: CPT | Performed by: FAMILY MEDICINE

## 2025-03-31 PROCEDURE — 87804 INFLUENZA ASSAY W/OPTIC: CPT | Performed by: FAMILY MEDICINE

## 2025-03-31 RX ORDER — FLUTICASONE PROPIONATE 50 MCG
1 SPRAY, SUSPENSION (ML) NASAL DAILY
Qty: 16 G | Refills: 2 | Status: SHIPPED | OUTPATIENT
Start: 2025-03-31

## 2025-03-31 RX ORDER — LORATADINE 10 MG/1
10 TABLET ORAL DAILY
Qty: 90 TABLET | Refills: 1 | Status: SHIPPED | OUTPATIENT
Start: 2025-03-31 | End: 2025-09-27

## 2025-03-31 RX ORDER — AZELASTINE 1 MG/ML
1 SPRAY, METERED NASAL 2 TIMES DAILY
Qty: 60 ML | Refills: 1 | Status: SHIPPED | OUTPATIENT
Start: 2025-03-31

## 2025-03-31 RX ORDER — PSEUDOEPHEDRINE HCL 30 MG/1
30 TABLET, FILM COATED ORAL 3 TIMES DAILY PRN
Qty: 15 TABLET | Refills: 1 | Status: SHIPPED | OUTPATIENT
Start: 2025-03-31 | End: 2026-03-31

## 2025-03-31 RX ORDER — MONTELUKAST SODIUM 10 MG/1
10 TABLET ORAL
Qty: 30 TABLET | Refills: 2 | Status: SHIPPED | OUTPATIENT
Start: 2025-03-31

## 2025-03-31 ASSESSMENT — PATIENT HEALTH QUESTIONNAIRE - PHQ9
SUM OF ALL RESPONSES TO PHQ QUESTIONS 1-9: 0
SUM OF ALL RESPONSES TO PHQ QUESTIONS 1-9: 0
2. FEELING DOWN, DEPRESSED OR HOPELESS: NOT AT ALL
SUM OF ALL RESPONSES TO PHQ QUESTIONS 1-9: 0
1. LITTLE INTEREST OR PLEASURE IN DOING THINGS: NOT AT ALL
SUM OF ALL RESPONSES TO PHQ QUESTIONS 1-9: 0

## 2025-03-31 NOTE — PROGRESS NOTES
Patient here today for a sick visit. Complaining cough, chest congestion, runny noise, sinus pressure, greeinsh colored mucous. States started 2 weeks ago.    Chief Complaint   Patient presents with    sick visit       \"Have you been to the ER, urgent care clinic since your last visit?  Hospitalized since your last visit?\"    NO    “Have you seen or consulted any other health care providers outside our system since your last visit?”    NO      “Have you had a diabetic eye exam?”    NO     Date of last diabetic eye exam: 11/7/2022         
tender to percussion.    Cardiovascular:      Rate and Rhythm: Normal rate and regular rhythm.      Heart sounds: Normal heart sounds.   Pulmonary:      Effort: Pulmonary effort is normal. No respiratory distress.      Breath sounds: Normal breath sounds. No wheezing, rhonchi or rales.   Skin:     Findings: No rash.   Neurological:      Mental Status: He is alert.         Lab Results   Component Value Date/Time    WBC 6.7 07/10/2024 12:00 AM    HGB 16.2 07/10/2024 12:00 AM    HCT 48.7 07/10/2024 12:00 AM     07/10/2024 12:00 AM    MCV 87 07/10/2024 12:00 AM     Lab Results   Component Value Date/Time     07/10/2024 12:00 AM    K 4.1 07/10/2024 12:00 AM     07/10/2024 12:00 AM    CO2 20 07/10/2024 12:00 AM    BUN 10 07/10/2024 12:00 AM    GFRAA 113 06/17/2021 11:07 AM    ALT 36 07/10/2024 12:00 AM    AST 30 07/10/2024 12:00 AM     Lab Results   Component Value Date/Time    CHOL 88 07/10/2024 12:00 AM    HDL 30 07/10/2024 12:00 AM    LDL 30 07/10/2024 12:00 AM    LDL 29 05/05/2023 12:00 AM    VLDL 28 07/10/2024 12:00 AM    VLDL 26 06/17/2021 11:07 AM       On this date 03/31/25 I have spent >20 minutes for chart review, face to face encounter with the patient for interview/exam, discussing working diagnosis and treatment plan as well as documenting on the day of the visit.     Medical decision making complexity: moderate-high.    Masha Shankar MD   Family & Geriatric Medicine

## 2025-05-20 DIAGNOSIS — E11.9 TYPE 2 DIABETES MELLITUS WITHOUT COMPLICATION, WITHOUT LONG-TERM CURRENT USE OF INSULIN (HCC): ICD-10-CM

## 2025-06-02 DIAGNOSIS — E11.9 TYPE 2 DIABETES MELLITUS WITHOUT COMPLICATION, WITHOUT LONG-TERM CURRENT USE OF INSULIN (HCC): ICD-10-CM

## 2025-06-02 RX ORDER — DAPAGLIFLOZIN 10 MG/1
10 TABLET, FILM COATED ORAL DAILY
Qty: 90 TABLET | Refills: 0 | Status: SHIPPED | OUTPATIENT
Start: 2025-06-02

## 2025-06-21 DIAGNOSIS — E11.9 TYPE 2 DIABETES MELLITUS WITHOUT COMPLICATION, WITHOUT LONG-TERM CURRENT USE OF INSULIN (HCC): ICD-10-CM

## 2025-06-23 RX ORDER — SEMAGLUTIDE 2.68 MG/ML
INJECTION, SOLUTION SUBCUTANEOUS
Qty: 3 ML | Refills: 0 | Status: SHIPPED | OUTPATIENT
Start: 2025-06-23

## 2025-06-27 ENCOUNTER — OFFICE VISIT (OUTPATIENT)
Facility: CLINIC | Age: 65
End: 2025-06-27
Payer: COMMERCIAL

## 2025-06-27 VITALS
RESPIRATION RATE: 18 BRPM | WEIGHT: 270 LBS | HEIGHT: 72 IN | SYSTOLIC BLOOD PRESSURE: 110 MMHG | HEART RATE: 84 BPM | TEMPERATURE: 97.2 F | OXYGEN SATURATION: 95 % | BODY MASS INDEX: 36.57 KG/M2 | DIASTOLIC BLOOD PRESSURE: 64 MMHG

## 2025-06-27 DIAGNOSIS — E66.9 OBESITY (BMI 30-39.9): ICD-10-CM

## 2025-06-27 DIAGNOSIS — Z13.21 ENCOUNTER FOR VITAMIN DEFICIENCY SCREENING: ICD-10-CM

## 2025-06-27 DIAGNOSIS — I10 PRIMARY HYPERTENSION: ICD-10-CM

## 2025-06-27 DIAGNOSIS — Z13.29 THYROID DISORDER SCREENING: ICD-10-CM

## 2025-06-27 DIAGNOSIS — Z85.46 HISTORY OF PROSTATE CANCER: ICD-10-CM

## 2025-06-27 DIAGNOSIS — M19.90 ARTHRITIS: ICD-10-CM

## 2025-06-27 DIAGNOSIS — E11.9 TYPE 2 DIABETES MELLITUS WITHOUT COMPLICATION, WITHOUT LONG-TERM CURRENT USE OF INSULIN (HCC): ICD-10-CM

## 2025-06-27 DIAGNOSIS — E66.01 SEVERE OBESITY (BMI 35.0-39.9) WITH COMORBIDITY (HCC): ICD-10-CM

## 2025-06-27 DIAGNOSIS — E78.2 MIXED HYPERLIPIDEMIA: ICD-10-CM

## 2025-06-27 DIAGNOSIS — Z00.00 WELL ADULT EXAM: Primary | ICD-10-CM

## 2025-06-27 LAB — HBA1C MFR BLD: 7.5 %

## 2025-06-27 PROCEDURE — 83036 HEMOGLOBIN GLYCOSYLATED A1C: CPT | Performed by: NURSE PRACTITIONER

## 2025-06-27 PROCEDURE — 3074F SYST BP LT 130 MM HG: CPT | Performed by: NURSE PRACTITIONER

## 2025-06-27 PROCEDURE — 3078F DIAST BP <80 MM HG: CPT | Performed by: NURSE PRACTITIONER

## 2025-06-27 PROCEDURE — 99396 PREV VISIT EST AGE 40-64: CPT | Performed by: NURSE PRACTITIONER

## 2025-06-27 PROCEDURE — 36415 COLL VENOUS BLD VENIPUNCTURE: CPT | Performed by: NURSE PRACTITIONER

## 2025-06-27 ASSESSMENT — PATIENT HEALTH QUESTIONNAIRE - PHQ9
1. LITTLE INTEREST OR PLEASURE IN DOING THINGS: NOT AT ALL
SUM OF ALL RESPONSES TO PHQ QUESTIONS 1-9: 0
SUM OF ALL RESPONSES TO PHQ QUESTIONS 1-9: 0
2. FEELING DOWN, DEPRESSED OR HOPELESS: NOT AT ALL
SUM OF ALL RESPONSES TO PHQ QUESTIONS 1-9: 0
SUM OF ALL RESPONSES TO PHQ QUESTIONS 1-9: 0

## 2025-06-27 NOTE — PROGRESS NOTES
Chief Complaint   Patient presents with    New Patient     Establish care       \"Have you been to the ER, urgent care clinic since your last visit?  Hospitalized since your last visit?\"    NO    “Have you seen or consulted any other health care providers outside our system since your last visit?”    NO      “Have you had a diabetic eye exam?”    YES - Where: [facility] signed    Date of last diabetic eye exam: 11/7/2022

## 2025-06-27 NOTE — ASSESSMENT & PLAN NOTE
Continue medication  A1c mildly improved to 7.5      Orders:    AMB POC HEMOGLOBIN A1C    COLLECTION CAPILLARY BLOOD SPECIMEN    CBC with Auto Differential; Future    Comprehensive Metabolic Panel; Future    CT CARDIAC CALCIUM SCORING; Future

## 2025-06-27 NOTE — PROGRESS NOTES
Blaine Torres (: 1960) is a 64 y.o. male patient, here for evaluation of the following chief complaint(s):  New Patient (Establish care)       ASSESSMENT/PLAN:  Assessment & Plan  Well adult exam       Orders:    CBC with Auto Differential; Future    Comprehensive Metabolic Panel; Future    Lipid Panel; Future    Vitamin D 25 Hydroxy; Future    Vitamin B12; Future    TSH; Future    PSA, Diagnostic; Future    Type 2 diabetes mellitus without complication, without long-term current use of insulin (HCC)  Continue medication  A1c mildly improved to 7.5      Orders:    AMB POC HEMOGLOBIN A1C    COLLECTION CAPILLARY BLOOD SPECIMEN    CBC with Auto Differential; Future    Comprehensive Metabolic Panel; Future    CT CARDIAC CALCIUM SCORING; Future    Arthritis       Orders:    diclofenac sodium (VOLTAREN) 1 % GEL; Apply 2 g topically 2 times daily as needed for Pain    Obesity (BMI 30-39.9)            Severe obesity (BMI 35.0-39.9) with comorbidity (HCC)       Orders:    CT CARDIAC CALCIUM SCORING; Future    Mixed hyperlipidemia       Orders:    Lipid Panel; Future    History of prostate cancer       Orders:    PSA, Diagnostic; Future    Thyroid disorder screening       Orders:    TSH; Future    Encounter for vitamin deficiency screening       Orders:    Vitamin D 25 Hydroxy; Future    Vitamin B12; Future    Primary hypertension       Orders:    CT CARDIAC CALCIUM SCORING; Future       CT calcium score  Lab work  Medications continued as directed  Hemoglobin A1c 7.5 patient will do some diet changes such as restricting sugar in diet          Return in about 3 weeks (around 2025) for Telephone visit is okay if appropriate to discuss labs and CT calcium score.      SUBJECTIVE/OBJECTIVE:  64-year-old male presents for follow-up on chronic conditions establishment of care and on this provider  Patient states he is here for his annual blood work and exam  History of type 2 diabetes obstructive sleep apnea seasonal

## 2025-06-27 NOTE — PROGRESS NOTES
Verbal order from PRESLEY Brown to order labs/sign and draw them in office    Labs were drawn and sent to [facility] by Amy Randall LPN:    The following tubes were sent:    1 Lavendar, 0 Red, 3 SST, 0 Urine    Draw site right antecubital.  Patient tolerated draw with no distress.

## 2025-06-27 NOTE — PROGRESS NOTES
Fingerstick for HBa1C done in first finger by Columba Ren LPN per order of PRESLEY Brown after cleaning area with alcohol wipe.  Patient tolerated procedure well.

## 2025-07-01 LAB
ALBUMIN SERPL-MCNC: 4.3 G/DL (ref 3.9–4.9)
ALP SERPL-CCNC: 50 IU/L (ref 44–121)
ALT SERPL-CCNC: 37 IU/L (ref 0–44)
AST SERPL-CCNC: 33 IU/L (ref 0–40)
BASOPHILS # BLD AUTO: 0.1 X10E3/UL (ref 0–0.2)
BASOPHILS NFR BLD AUTO: 1 %
BILIRUB SERPL-MCNC: 0.3 MG/DL (ref 0–1.2)
BUN SERPL-MCNC: 15 MG/DL (ref 8–27)
BUN/CREAT SERPL: 14 (ref 10–24)
CALCIUM SERPL-MCNC: 9.8 MG/DL (ref 8.6–10.2)
CHLORIDE SERPL-SCNC: 99 MMOL/L (ref 96–106)
CHOLEST SERPL-MCNC: 101 MG/DL (ref 100–199)
CO2 SERPL-SCNC: 22 MMOL/L (ref 20–29)
CREAT SERPL-MCNC: 1.07 MG/DL (ref 0.76–1.27)
EGFRCR SERPLBLD CKD-EPI 2021: 78 ML/MIN/1.73
EOSINOPHIL # BLD AUTO: 0.2 X10E3/UL (ref 0–0.4)
EOSINOPHIL NFR BLD AUTO: 3 %
ERYTHROCYTE [DISTWIDTH] IN BLOOD BY AUTOMATED COUNT: 13.3 % (ref 11.6–15.4)
GLOBULIN SER CALC-MCNC: 2.5 G/DL (ref 1.5–4.5)
GLUCOSE SERPL-MCNC: 140 MG/DL (ref 70–99)
HCT VFR BLD AUTO: 49.9 % (ref 37.5–51)
HDLC SERPL-MCNC: 30 MG/DL
HGB BLD-MCNC: 16.7 G/DL (ref 13–17.7)
IMM GRANULOCYTES # BLD AUTO: 0 X10E3/UL (ref 0–0.1)
IMM GRANULOCYTES NFR BLD AUTO: 0 %
LDLC SERPL CALC-MCNC: 35 MG/DL (ref 0–99)
LYMPHOCYTES # BLD AUTO: 1.9 X10E3/UL (ref 0.7–3.1)
LYMPHOCYTES NFR BLD AUTO: 29 %
MCH RBC QN AUTO: 29.5 PG (ref 26.6–33)
MCHC RBC AUTO-ENTMCNC: 33.5 G/DL (ref 31.5–35.7)
MCV RBC AUTO: 88 FL (ref 79–97)
MONOCYTES # BLD AUTO: 0.5 X10E3/UL (ref 0.1–0.9)
MONOCYTES NFR BLD AUTO: 8 %
NEUTROPHILS # BLD AUTO: 3.9 X10E3/UL (ref 1.4–7)
NEUTROPHILS NFR BLD AUTO: 59 %
PLATELET # BLD AUTO: 251 X10E3/UL (ref 150–450)
POTASSIUM SERPL-SCNC: 4.2 MMOL/L (ref 3.5–5.2)
PROT SERPL-MCNC: 6.8 G/DL (ref 6–8.5)
RBC # BLD AUTO: 5.67 X10E6/UL (ref 4.14–5.8)
SODIUM SERPL-SCNC: 139 MMOL/L (ref 134–144)
SPECIMEN STATUS REPORT: NORMAL
TRIGL SERPL-MCNC: 227 MG/DL (ref 0–149)
VLDLC SERPL CALC-MCNC: 36 MG/DL (ref 5–40)
WBC # BLD AUTO: 6.5 X10E3/UL (ref 3.4–10.8)

## 2025-07-02 LAB — SPECIMEN STATUS REPORT: NORMAL

## 2025-07-03 LAB
25(OH)D3+25(OH)D2 SERPL-MCNC: 34.3 NG/ML (ref 30–100)
PSA SERPL-MCNC: <0.1 NG/ML (ref 0–4)
TSH SERPL DL<=0.005 MIU/L-ACNC: 1.91 UIU/ML (ref 0.45–4.5)

## 2025-07-07 DIAGNOSIS — I10 ESSENTIAL (PRIMARY) HYPERTENSION: ICD-10-CM

## 2025-07-07 RX ORDER — LISINOPRIL AND HYDROCHLOROTHIAZIDE 20; 25 MG/1; MG/1
1 TABLET ORAL DAILY
Qty: 90 TABLET | Refills: 1 | Status: SHIPPED | OUTPATIENT
Start: 2025-07-07

## 2025-07-07 RX ORDER — AMLODIPINE BESYLATE 5 MG/1
5 TABLET ORAL DAILY
Qty: 90 TABLET | Refills: 1 | Status: SHIPPED | OUTPATIENT
Start: 2025-07-07

## 2025-07-07 NOTE — TELEPHONE ENCOUNTER
Patient requested refills on BP medications. Refills sent, patient established care with EITAN [facility] on 6/27/2025.

## 2025-07-23 ENCOUNTER — HOSPITAL ENCOUNTER (OUTPATIENT)
Age: 65
Discharge: HOME | End: 2025-07-26

## 2025-07-23 DIAGNOSIS — E66.01 SEVERE OBESITY (BMI 35.0-39.9) WITH COMORBIDITY (HCC): ICD-10-CM

## 2025-07-23 DIAGNOSIS — E11.9 TYPE 2 DIABETES MELLITUS WITHOUT COMPLICATION, WITHOUT LONG-TERM CURRENT USE OF INSULIN (HCC): ICD-10-CM

## 2025-07-23 DIAGNOSIS — I10 PRIMARY HYPERTENSION: ICD-10-CM

## 2025-07-23 PROCEDURE — 75571 CT HRT W/O DYE W/CA TEST: CPT

## 2025-07-23 RX ORDER — SEMAGLUTIDE 2.68 MG/ML
INJECTION, SOLUTION SUBCUTANEOUS
Qty: 3 ML | Refills: 2 | Status: SHIPPED | OUTPATIENT
Start: 2025-07-23

## 2025-07-29 ENCOUNTER — OFFICE VISIT (OUTPATIENT)
Facility: CLINIC | Age: 65
End: 2025-07-29
Payer: COMMERCIAL

## 2025-07-29 VITALS
RESPIRATION RATE: 20 BRPM | WEIGHT: 278.4 LBS | TEMPERATURE: 98.4 F | SYSTOLIC BLOOD PRESSURE: 110 MMHG | BODY MASS INDEX: 37.71 KG/M2 | OXYGEN SATURATION: 95 % | HEIGHT: 72 IN | DIASTOLIC BLOOD PRESSURE: 65 MMHG | HEART RATE: 81 BPM

## 2025-07-29 DIAGNOSIS — I10 ESSENTIAL (PRIMARY) HYPERTENSION: ICD-10-CM

## 2025-07-29 DIAGNOSIS — E78.2 MIXED HYPERLIPIDEMIA: ICD-10-CM

## 2025-07-29 DIAGNOSIS — K76.0 NONALCOHOLIC FATTY LIVER: Primary | ICD-10-CM

## 2025-07-29 PROCEDURE — 3074F SYST BP LT 130 MM HG: CPT | Performed by: NURSE PRACTITIONER

## 2025-07-29 PROCEDURE — 99214 OFFICE O/P EST MOD 30 MIN: CPT | Performed by: NURSE PRACTITIONER

## 2025-07-29 PROCEDURE — 3078F DIAST BP <80 MM HG: CPT | Performed by: NURSE PRACTITIONER

## 2025-07-29 ASSESSMENT — PATIENT HEALTH QUESTIONNAIRE - PHQ9
SUM OF ALL RESPONSES TO PHQ QUESTIONS 1-9: 0
SUM OF ALL RESPONSES TO PHQ QUESTIONS 1-9: 0
1. LITTLE INTEREST OR PLEASURE IN DOING THINGS: NOT AT ALL
2. FEELING DOWN, DEPRESSED OR HOPELESS: NOT AT ALL
SUM OF ALL RESPONSES TO PHQ QUESTIONS 1-9: 0
SUM OF ALL RESPONSES TO PHQ QUESTIONS 1-9: 0

## 2025-07-29 NOTE — PROGRESS NOTES
Chief Complaint   Patient presents with    Follow-up     Chronic disease       \"Have you been to the ER, urgent care clinic since your last visit?  Hospitalized since your last visit?\"    NO    “Have you seen or consulted any other health care providers outside our system since your last visit?”    NO        
 BMI 37.76 kg/m²        Physical Exam   General:  alert, cooperative, well appearing, in no apparent distress.  CV:  The heart sounds are regular in rate and rhythm.  There is a normal S1 and S2.  There are no  murmurs, rubs, or gallops.  Distal pulses are intact and equal.  Lungs:   Lung sounds are clear and equal to auscultation throughout all lung fields without wheezing, rales, or rhonchi.  Inspiratory and expiratory efforts are full and unlabored.  GI:  The abdomen is soft and nontender.  Extremities:  There is no clubbing, cyanosis, or edema.  3+ peripheral pulses.cap refill less than 2 seconds   Neurological:    There is no obvious focal sensory or motor deficits.           I recommend this patient have regular follow-up appointments for general health evaluations and management, sooner appointments if indicated for acute concerns    This chart was prepared using voice recognition software and may contain unintended word substitution errors    I have discussed the diagnosis with the patient and the intended plan as seen in the above orders.  Questions were answered concerning future plans.  I have discussed medication side effects and warnings with the patient as well. I have reviewed the plan of care with the patient, accepted their input and they are in agreement with the treatment goals. Previous lab and imaging results were reviewed by me.       On this date 7/29/2025 I have spent 30 minutes reviewing previous notes, test results and face to face with the patient discussing the diagnosis and importance of compliance with the treatment plan as well as documenting on the day of the visit.    An electronic signature was used to authenticate this note.  -- KVNG Sumner

## 2025-07-30 DIAGNOSIS — E11.9 TYPE 2 DIABETES MELLITUS WITHOUT COMPLICATION, WITHOUT LONG-TERM CURRENT USE OF INSULIN (HCC): ICD-10-CM

## 2025-07-30 RX ORDER — SEMAGLUTIDE 2.68 MG/ML
INJECTION, SOLUTION SUBCUTANEOUS
Qty: 3 ML | Refills: 2 | Status: SHIPPED | OUTPATIENT
Start: 2025-07-30

## 2025-08-05 DIAGNOSIS — E78.2 MIXED HYPERLIPIDEMIA: ICD-10-CM

## 2025-08-05 DIAGNOSIS — E66.9 OBESITY (BMI 30-39.9): ICD-10-CM

## 2025-08-05 DIAGNOSIS — I10 ESSENTIAL (PRIMARY) HYPERTENSION: ICD-10-CM

## 2025-08-05 DIAGNOSIS — R93.1 AGATSTON CORONARY ARTERY CALCIUM SCORE GREATER THAN 400: Primary | ICD-10-CM

## 2025-08-26 DIAGNOSIS — E11.9 TYPE 2 DIABETES MELLITUS WITHOUT COMPLICATION, WITHOUT LONG-TERM CURRENT USE OF INSULIN (HCC): ICD-10-CM

## 2025-08-26 RX ORDER — DAPAGLIFLOZIN 10 MG/1
10 TABLET, FILM COATED ORAL DAILY
Qty: 90 TABLET | Refills: 1 | Status: SHIPPED | OUTPATIENT
Start: 2025-08-26